# Patient Record
Sex: MALE | Race: OTHER | NOT HISPANIC OR LATINO | URBAN - METROPOLITAN AREA
[De-identification: names, ages, dates, MRNs, and addresses within clinical notes are randomized per-mention and may not be internally consistent; named-entity substitution may affect disease eponyms.]

---

## 2020-05-21 ENCOUNTER — INPATIENT (INPATIENT)
Facility: HOSPITAL | Age: 55
LOS: 5 days | Discharge: HOME | DRG: 811 | End: 2020-05-27
Attending: HOSPITALIST | Admitting: HOSPITALIST
Payer: MEDICAID

## 2020-05-21 VITALS
RESPIRATION RATE: 18 BRPM | OXYGEN SATURATION: 98 % | SYSTOLIC BLOOD PRESSURE: 97 MMHG | HEART RATE: 92 BPM | DIASTOLIC BLOOD PRESSURE: 66 MMHG | TEMPERATURE: 98 F

## 2020-05-21 DIAGNOSIS — Z29.9 ENCOUNTER FOR PROPHYLACTIC MEASURES, UNSPECIFIED: ICD-10-CM

## 2020-05-21 DIAGNOSIS — K92.1 MELENA: ICD-10-CM

## 2020-05-21 DIAGNOSIS — D64.9 ANEMIA, UNSPECIFIED: ICD-10-CM

## 2020-05-21 LAB
ALBUMIN SERPL ELPH-MCNC: 2.7 G/DL — LOW (ref 3.5–5)
ALP SERPL-CCNC: 39 U/L — LOW (ref 40–120)
ALT FLD-CCNC: 18 U/L DA — SIGNIFICANT CHANGE UP (ref 10–60)
ANION GAP SERPL CALC-SCNC: 6 MMOL/L — SIGNIFICANT CHANGE UP (ref 5–17)
APTT BLD: 27.5 SEC — SIGNIFICANT CHANGE UP (ref 27.5–36.3)
AST SERPL-CCNC: 12 U/L — SIGNIFICANT CHANGE UP (ref 10–40)
BASOPHILS # BLD AUTO: 0.01 K/UL — SIGNIFICANT CHANGE UP (ref 0–0.2)
BASOPHILS NFR BLD AUTO: 0.1 % — SIGNIFICANT CHANGE UP (ref 0–2)
BILIRUB SERPL-MCNC: 0.4 MG/DL — SIGNIFICANT CHANGE UP (ref 0.2–1.2)
BUN SERPL-MCNC: 32 MG/DL — HIGH (ref 7–18)
CALCIUM SERPL-MCNC: 7.3 MG/DL — LOW (ref 8.4–10.5)
CHLORIDE SERPL-SCNC: 107 MMOL/L — SIGNIFICANT CHANGE UP (ref 96–108)
CO2 SERPL-SCNC: 27 MMOL/L — SIGNIFICANT CHANGE UP (ref 22–31)
CREAT SERPL-MCNC: 0.68 MG/DL — SIGNIFICANT CHANGE UP (ref 0.5–1.3)
EOSINOPHIL # BLD AUTO: 0.01 K/UL — SIGNIFICANT CHANGE UP (ref 0–0.5)
EOSINOPHIL NFR BLD AUTO: 0.1 % — SIGNIFICANT CHANGE UP (ref 0–6)
GLUCOSE SERPL-MCNC: 145 MG/DL — HIGH (ref 70–99)
HCT VFR BLD CALC: 14.5 % — CRITICAL LOW (ref 39–50)
HGB BLD-MCNC: 5.1 G/DL — CRITICAL LOW (ref 13–17)
IMM GRANULOCYTES NFR BLD AUTO: 0.8 % — SIGNIFICANT CHANGE UP (ref 0–1.5)
INR BLD: 1.27 RATIO — HIGH (ref 0.88–1.16)
IRON SATN MFR SERPL: 47 % — SIGNIFICANT CHANGE UP (ref 20–55)
IRON SATN MFR SERPL: 99 UG/DL — SIGNIFICANT CHANGE UP (ref 65–170)
LYMPHOCYTES # BLD AUTO: 1.09 K/UL — SIGNIFICANT CHANGE UP (ref 1–3.3)
LYMPHOCYTES # BLD AUTO: 12.2 % — LOW (ref 13–44)
MAGNESIUM SERPL-MCNC: 1.8 MG/DL — SIGNIFICANT CHANGE UP (ref 1.6–2.6)
MCHC RBC-ENTMCNC: 32.5 PG — SIGNIFICANT CHANGE UP (ref 27–34)
MCHC RBC-ENTMCNC: 35.2 GM/DL — SIGNIFICANT CHANGE UP (ref 32–36)
MCV RBC AUTO: 92.4 FL — SIGNIFICANT CHANGE UP (ref 80–100)
MONOCYTES # BLD AUTO: 0.55 K/UL — SIGNIFICANT CHANGE UP (ref 0–0.9)
MONOCYTES NFR BLD AUTO: 6.1 % — SIGNIFICANT CHANGE UP (ref 2–14)
NEUTROPHILS # BLD AUTO: 7.23 K/UL — SIGNIFICANT CHANGE UP (ref 1.8–7.4)
NEUTROPHILS NFR BLD AUTO: 80.7 % — HIGH (ref 43–77)
NRBC # BLD: 0 /100 WBCS — SIGNIFICANT CHANGE UP (ref 0–0)
OB PNL STL: POSITIVE
PLATELET # BLD AUTO: 162 K/UL — SIGNIFICANT CHANGE UP (ref 150–400)
POTASSIUM SERPL-MCNC: 3.7 MMOL/L — SIGNIFICANT CHANGE UP (ref 3.5–5.3)
POTASSIUM SERPL-SCNC: 3.7 MMOL/L — SIGNIFICANT CHANGE UP (ref 3.5–5.3)
PROT SERPL-MCNC: 5.1 G/DL — LOW (ref 6–8.3)
PROTHROM AB SERPL-ACNC: 14.4 SEC — HIGH (ref 10–12.9)
RBC # BLD: 1.57 M/UL — LOW (ref 4.2–5.8)
RBC # FLD: 12.3 % — SIGNIFICANT CHANGE UP (ref 10.3–14.5)
SARS-COV-2 RNA SPEC QL NAA+PROBE: SIGNIFICANT CHANGE UP
SODIUM SERPL-SCNC: 140 MMOL/L — SIGNIFICANT CHANGE UP (ref 135–145)
TIBC SERPL-MCNC: 212 UG/DL — LOW (ref 250–450)
TROPONIN I SERPL-MCNC: <0.015 NG/ML — SIGNIFICANT CHANGE UP (ref 0–0.04)
UIBC SERPL-MCNC: 113 UG/DL — SIGNIFICANT CHANGE UP (ref 110–370)
WBC # BLD: 8.96 K/UL — SIGNIFICANT CHANGE UP (ref 3.8–10.5)
WBC # FLD AUTO: 8.96 K/UL — SIGNIFICANT CHANGE UP (ref 3.8–10.5)

## 2020-05-21 PROCEDURE — 99223 1ST HOSP IP/OBS HIGH 75: CPT

## 2020-05-21 PROCEDURE — 99291 CRITICAL CARE FIRST HOUR: CPT

## 2020-05-21 PROCEDURE — 71045 X-RAY EXAM CHEST 1 VIEW: CPT | Mod: 26

## 2020-05-21 RX ORDER — PANTOPRAZOLE SODIUM 20 MG/1
40 TABLET, DELAYED RELEASE ORAL ONCE
Refills: 0 | Status: COMPLETED | OUTPATIENT
Start: 2020-05-21 | End: 2020-05-21

## 2020-05-21 RX ORDER — PANTOPRAZOLE SODIUM 20 MG/1
40 TABLET, DELAYED RELEASE ORAL EVERY 12 HOURS
Refills: 0 | Status: DISCONTINUED | OUTPATIENT
Start: 2020-05-21 | End: 2020-05-22

## 2020-05-21 RX ORDER — SODIUM CHLORIDE 9 MG/ML
1000 INJECTION INTRAMUSCULAR; INTRAVENOUS; SUBCUTANEOUS ONCE
Refills: 0 | Status: COMPLETED | OUTPATIENT
Start: 2020-05-21 | End: 2020-05-21

## 2020-05-21 RX ADMIN — SODIUM CHLORIDE 1000 MILLILITER(S): 9 INJECTION INTRAMUSCULAR; INTRAVENOUS; SUBCUTANEOUS at 19:13

## 2020-05-21 RX ADMIN — PANTOPRAZOLE SODIUM 40 MILLIGRAM(S): 20 TABLET, DELAYED RELEASE ORAL at 20:58

## 2020-05-21 RX ADMIN — SODIUM CHLORIDE 1000 MILLILITER(S): 9 INJECTION INTRAMUSCULAR; INTRAVENOUS; SUBCUTANEOUS at 20:56

## 2020-05-21 NOTE — ED PROVIDER NOTE - GASTROINTESTINAL, MLM
Abdomen soft, non-tender, no guarding; rectal exam reveals dark brown/blackish stool, slight reddish hue

## 2020-05-21 NOTE — ED PROVIDER NOTE - CARE PLAN
Principal Discharge DX:	Gastrointestinal hemorrhage with melena  Secondary Diagnosis:	Severe anemia  Secondary Diagnosis:	Syncope, unspecified syncope type

## 2020-05-21 NOTE — ED PROVIDER NOTE - CLINICAL SUMMARY MEDICAL DECISION MAKING FREE TEXT BOX
54 y/o man, no known PMH, c/o 2 days of melena, vomiting w/dark colored vomitus, generalized weakness.  He also had syncope x 2 today with no injury reported--labs, EKG, CXR, IVF, reassess.

## 2020-05-21 NOTE — ED ADULT NURSE NOTE - NSIMPLEMENTINTERV_GEN_ALL_ED
Implemented All Fall Risk Interventions:  [place] to call system. Call bell, personal items and telephone within reach. Instruct patient to call for assistance. Room bathroom lighting operational. Non-slip footwear when patient is off stretcher. Physically safe environment: no spills, clutter or unnecessary equipment. Stretcher in lowest position, wheels locked, appropriate side rails in place. Provide visual cue, wrist band, yellow gown, etc. Monitor gait and stability. Monitor for mental status changes and reorient to person, place, and time. Review medications for side effects contributing to fall risk. Reinforce activity limits and safety measures with patient and family.

## 2020-05-21 NOTE — H&P ADULT - HISTORY OF PRESENT ILLNESS
Patient is 55 year old male with hx of gout came to ED for bloody stool for 2-3 days.  per pt he started to have blood stool 3 days ago, 2 episode a day. Pt also had 2 episodes vomiting with blood. today pt was feeling weak and fainted as well but he did not fall as he sat down with support of door which was witnessed by kinglord. Pt pt denies having EGD /colonoscopy in the past. Pt never had hx of gi bleed but had a episode of black stool long back. pt was given gout medication 2 weeks ago. but does not remember the medication or pharmacy name. pt states his father and mother had liver cancer. pt works at [employer].   pt's hemoglobin was found 5.1, anemia panel was sent, ppi was given and pt is being transfused 2 prbcs. Denies taking any pain medication.

## 2020-05-21 NOTE — H&P ADULT - ATTENDING COMMENTS
Hx taken via video translation   55 year old Mandarin speaking man with no significant PMH except GOUT presenting with 3 days of melena and 1 day of hematemesis. There was associated dizziness and a syncopal episode - unwitnessed. His landlord saw him in the doorway and was sent to the ED. No abdominal pain, fever, CVS or resp symptoms on ROS.  No prior episodes.   No significant use of NSAIDs ( but uses a prescribed pain meds for his gout but does not know the name). No significant alcohol use or tobacco abuse.  No allergy to meds or food. Only uses gout meds,( does not know the name or pharmacy) 2 dietary supplements - one of which is beeswax  In the ED, he felt better with a liter of NS; his hgb is 5.1 but the baseline is not known.    Vital Signs Last 24 Hrs  T(C): 36.4 (21 May 2020 21:54), Max: 36.9 (21 May 2020 18:29)  T(F): 97.6 (21 May 2020 21:54), Max: 98.5 (21 May 2020 18:29)  HR: 83 (21 May 2020 21:54) (78 - 92)  BP: 103/57 (21 May 2020 21:54) (93/55 - 123/70)-  RR: 18 (21 May 2020 21:54) (18 - 18)  SpO2: 100% (21 May 2020 21:54) (98% - 100%)    Middle aged man, NAD AAO X 3  No obvious trauma to head and neck   CTA B/L   RRR S1S2 only  Soft, NT ND BS +  No pedal edema  No focal deficits    Labs                        5.1    8.96  )-----------( 162      ( 21 May 2020 19:13 )             14.5     05-21    140  |  107  |  32<H>  ----------------------------<  145<H>  3.7   |  27  |  0.68    Ca    7.3<L>      21 May 2020 19:05  Mg     1.8     05-21    TPro  5.1<L>  /  Alb  2.7<L>  /  TBili  0.4  /  DBili  x   /  AST  12  /  ALT  18  /  AlkPhos  39<L>  05-21    PT/INR - ( 21 May 2020 19:34 )   PT: 14.4 sec;   INR: 1.27 ratio    PTT - ( 21 May 2020 19:34 )  PTT:27.5 sec    Iron panel - WNL  Trop -ve X 1   EKG -- NSR    Impression  - Acute symptomatic anemia  - Suspected upper GI bleeding  - Syncopal episode secondary to acute blood loss anemia  - Hyperglycemia - r/o DM    Plan  Admit to Medicine  serial H/H  type and cross 2 units of packed red cells ans transfuse  CTA abdomen to r/o active bleeding  No evidence of iron deficiency  Protonix 40mg IV q 12 hourly   Check HgA1c  Syncopal episode secondary to blood loss; no further syncope workup planned Hx taken via video translation   Case discussed with MAR.  HPI /ROS / PMH as above    55 year old Mandarin speaking man with no significant PMH except GOUT presenting with 3 days of melena and 1 day of hematemesis. There was associated dizziness and a syncopal episode - unwitnessed. His landlord saw him in the doorway and was sent to the ED. No abdominal pain, fever, CVS or resp symptoms on ROS.  No prior episodes.   No significant use of NSAIDs ( but uses a prescribed pain meds for his gout but does not know the name). No significant alcohol use or tobacco abuse.  No allergy to meds or food. Only uses gout meds,( does not know the name or pharmacy) 2 dietary supplements - one of which is beeswax  In the ED, he felt better with a liter of NS; his hgb is 5.1 but the baseline is not known.    Vital Signs Last 24 Hrs  T(C): 36.4 (21 May 2020 21:54), Max: 36.9 (21 May 2020 18:29)  T(F): 97.6 (21 May 2020 21:54), Max: 98.5 (21 May 2020 18:29)  HR: 83 (21 May 2020 21:54) (78 - 92)  BP: 103/57 (21 May 2020 21:54) (93/55 - 123/70)-  RR: 18 (21 May 2020 21:54) (18 - 18)  SpO2: 100% (21 May 2020 21:54) (98% - 100%)    Middle aged man, NAD AAO X 3  No obvious trauma to head and neck   CTA B/L   RRR S1S2 only  Soft, NT ND BS +  No pedal edema  No focal deficits    Labs                        5.1    8.96  )-----------( 162      ( 21 May 2020 19:13 )             14.5     05-21    140  |  107  |  32<H>  ----------------------------<  145<H>  3.7   |  27  |  0.68    Ca    7.3<L>      21 May 2020 19:05  Mg     1.8     05-21    TPro  5.1<L>  /  Alb  2.7<L>  /  TBili  0.4  /  DBili  x   /  AST  12  /  ALT  18  /  AlkPhos  39<L>  05-21    PT/INR - ( 21 May 2020 19:34 )   PT: 14.4 sec;   INR: 1.27 ratio    PTT - ( 21 May 2020 19:34 )  PTT:27.5 sec    Iron panel - WNL  Trop -ve X 1   EKG -- NSR    Impression  - Acute symptomatic anemia  - Suspected upper GI bleeding  - Syncopal episode secondary to acute blood loss anemia  - Hyperglycemia - r/o DM    Plan  Admit to Medicine  serial H/H  type and cross 2 units of packed red cells ans transfuse  CTA abdomen to r/o active bleeding  No evidence of iron deficiency  Protonix 40mg IV q 12 hourly   GI consult  Check HgA1c  Syncopal episode secondary to blood loss; no further syncope workup planned

## 2020-05-21 NOTE — H&P ADULT - NSHPPHYSICALEXAM_GEN_ALL_CORE
PHYSICAL EXAM:  GENERAL: NAD, well-developed  HEAD:  Atraumatic, Normocephalic  EYES: EOMI, PERRLA, conjunctiva and sclera clear  NECK: Supple, No JVD  CHEST/LUNG: Clear to auscultation bilaterally; No wheeze  HEART: Regular rate and rhythm; s1+ s2+  ABDOMEN: Soft, Nontender, Nondistended; Bowel sounds present  EXTREMITIES:, No clubbing, cyanosis, or edema  NEUROLOGY:AAOx3 non-focal  SKIN: No rashes or lesions

## 2020-05-21 NOTE — ED PROVIDER NOTE - OBJECTIVE STATEMENT
54 y/o man, no known PMH, c/o 2 days of melena, vomiting w/dark colored vomitus, generalized weakness.  He also had syncope x 2 today with no injury reported.  Had mild abdominal discomfort.  No CP/SOB/fever.  Denies h/o GIB.  Denies any use of antiplatelets or anticoagulation. 54 y/o man, no known PMH, c/o 2 days of melena, vomiting w/dark colored vomitus, generalized weakness.  He also had syncope x 2 today with no injury reported.  Had mild abdominal discomfort.  No CP/SOB/fever.  Denies h/o GIB.  Denies any use of antiplatelets or anticoagulation.    Oneyda Rafy, #[id number].

## 2020-05-21 NOTE — H&P ADULT - PROBLEM SELECTOR PLAN 1
Hemoglobin 5.1, likely gi bleed   not sure upper gi or lower gi   so far anemia panel is not consistent with iron deficiency anemia, f/u ferritin   s/p ppi   on ppi q12  getting 2 prbcs   f/u CT A abdomen r/o acitve bleed   GI consult

## 2020-05-22 LAB
24R-OH-CALCIDIOL SERPL-MCNC: 30.1 NG/ML — SIGNIFICANT CHANGE UP (ref 30–80)
A1C WITH ESTIMATED AVERAGE GLUCOSE RESULT: 5.6 % — SIGNIFICANT CHANGE UP (ref 4–5.6)
ALBUMIN SERPL ELPH-MCNC: 2.4 G/DL — LOW (ref 3.5–5)
ALP SERPL-CCNC: 36 U/L — LOW (ref 40–120)
ALT FLD-CCNC: 12 U/L DA — SIGNIFICANT CHANGE UP (ref 10–60)
ANION GAP SERPL CALC-SCNC: 4 MMOL/L — LOW (ref 5–17)
APPEARANCE UR: CLEAR — SIGNIFICANT CHANGE UP
AST SERPL-CCNC: 11 U/L — SIGNIFICANT CHANGE UP (ref 10–40)
BASOPHILS # BLD AUTO: 0.02 K/UL — SIGNIFICANT CHANGE UP (ref 0–0.2)
BASOPHILS NFR BLD AUTO: 0.3 % — SIGNIFICANT CHANGE UP (ref 0–2)
BILIRUB SERPL-MCNC: 0.9 MG/DL — SIGNIFICANT CHANGE UP (ref 0.2–1.2)
BILIRUB UR-MCNC: NEGATIVE — SIGNIFICANT CHANGE UP
BUN SERPL-MCNC: 20 MG/DL — HIGH (ref 7–18)
CALCIUM SERPL-MCNC: 7.2 MG/DL — LOW (ref 8.4–10.5)
CHLORIDE SERPL-SCNC: 112 MMOL/L — HIGH (ref 96–108)
CHOLEST SERPL-MCNC: 96 MG/DL — SIGNIFICANT CHANGE UP (ref 10–199)
CO2 SERPL-SCNC: 25 MMOL/L — SIGNIFICANT CHANGE UP (ref 22–31)
COLOR SPEC: YELLOW — SIGNIFICANT CHANGE UP
CREAT SERPL-MCNC: 0.54 MG/DL — SIGNIFICANT CHANGE UP (ref 0.5–1.3)
DIFF PNL FLD: NEGATIVE — SIGNIFICANT CHANGE UP
EOSINOPHIL # BLD AUTO: 0.02 K/UL — SIGNIFICANT CHANGE UP (ref 0–0.5)
EOSINOPHIL NFR BLD AUTO: 0.3 % — SIGNIFICANT CHANGE UP (ref 0–6)
ESTIMATED AVERAGE GLUCOSE: 114 MG/DL — SIGNIFICANT CHANGE UP (ref 68–114)
FERRITIN SERPL-MCNC: 134 NG/ML — SIGNIFICANT CHANGE UP (ref 30–400)
FOLATE SERPL-MCNC: 15.3 NG/ML — SIGNIFICANT CHANGE UP
GLUCOSE SERPL-MCNC: 116 MG/DL — HIGH (ref 70–99)
GLUCOSE UR QL: NEGATIVE — SIGNIFICANT CHANGE UP
HCT VFR BLD CALC: 18.9 % — CRITICAL LOW (ref 39–50)
HCT VFR BLD CALC: 21.4 % — LOW (ref 39–50)
HCT VFR BLD CALC: 22.7 % — LOW (ref 39–50)
HDLC SERPL-MCNC: 22 MG/DL — LOW
HGB BLD-MCNC: 6.5 G/DL — CRITICAL LOW (ref 13–17)
HGB BLD-MCNC: 7.5 G/DL — LOW (ref 13–17)
HGB BLD-MCNC: 8 G/DL — LOW (ref 13–17)
IMM GRANULOCYTES NFR BLD AUTO: 0.7 % — SIGNIFICANT CHANGE UP (ref 0–1.5)
KETONES UR-MCNC: NEGATIVE — SIGNIFICANT CHANGE UP
LEUKOCYTE ESTERASE UR-ACNC: NEGATIVE — SIGNIFICANT CHANGE UP
LIPID PNL WITH DIRECT LDL SERPL: 18 MG/DL — SIGNIFICANT CHANGE UP
LYMPHOCYTES # BLD AUTO: 1.45 K/UL — SIGNIFICANT CHANGE UP (ref 1–3.3)
LYMPHOCYTES # BLD AUTO: 24.6 % — SIGNIFICANT CHANGE UP (ref 13–44)
MAGNESIUM SERPL-MCNC: 1.9 MG/DL — SIGNIFICANT CHANGE UP (ref 1.6–2.6)
MCHC RBC-ENTMCNC: 31 PG — SIGNIFICANT CHANGE UP (ref 27–34)
MCHC RBC-ENTMCNC: 31.1 PG — SIGNIFICANT CHANGE UP (ref 27–34)
MCHC RBC-ENTMCNC: 31.4 PG — SIGNIFICANT CHANGE UP (ref 27–34)
MCHC RBC-ENTMCNC: 34.4 GM/DL — SIGNIFICANT CHANGE UP (ref 32–36)
MCHC RBC-ENTMCNC: 35 GM/DL — SIGNIFICANT CHANGE UP (ref 32–36)
MCHC RBC-ENTMCNC: 35.2 GM/DL — SIGNIFICANT CHANGE UP (ref 32–36)
MCV RBC AUTO: 88.3 FL — SIGNIFICANT CHANGE UP (ref 80–100)
MCV RBC AUTO: 88.4 FL — SIGNIFICANT CHANGE UP (ref 80–100)
MCV RBC AUTO: 91.3 FL — SIGNIFICANT CHANGE UP (ref 80–100)
MONOCYTES # BLD AUTO: 0.51 K/UL — SIGNIFICANT CHANGE UP (ref 0–0.9)
MONOCYTES NFR BLD AUTO: 8.6 % — SIGNIFICANT CHANGE UP (ref 2–14)
NEUTROPHILS # BLD AUTO: 3.86 K/UL — SIGNIFICANT CHANGE UP (ref 1.8–7.4)
NEUTROPHILS NFR BLD AUTO: 65.5 % — SIGNIFICANT CHANGE UP (ref 43–77)
NITRITE UR-MCNC: NEGATIVE — SIGNIFICANT CHANGE UP
NRBC # BLD: 0 /100 WBCS — SIGNIFICANT CHANGE UP (ref 0–0)
PH UR: 5 — SIGNIFICANT CHANGE UP (ref 5–8)
PHOSPHATE SERPL-MCNC: 2 MG/DL — LOW (ref 2.5–4.5)
PLATELET # BLD AUTO: 135 K/UL — LOW (ref 150–400)
PLATELET # BLD AUTO: 135 K/UL — LOW (ref 150–400)
PLATELET # BLD AUTO: 141 K/UL — LOW (ref 150–400)
POTASSIUM SERPL-MCNC: 3.7 MMOL/L — SIGNIFICANT CHANGE UP (ref 3.5–5.3)
POTASSIUM SERPL-SCNC: 3.7 MMOL/L — SIGNIFICANT CHANGE UP (ref 3.5–5.3)
PROT SERPL-MCNC: 4.4 G/DL — LOW (ref 6–8.3)
PROT UR-MCNC: NEGATIVE — SIGNIFICANT CHANGE UP
RBC # BLD: 2.07 M/UL — LOW (ref 4.2–5.8)
RBC # BLD: 2.42 M/UL — LOW (ref 4.2–5.8)
RBC # BLD: 2.57 M/UL — LOW (ref 4.2–5.8)
RBC # FLD: 12.7 % — SIGNIFICANT CHANGE UP (ref 10.3–14.5)
RBC # FLD: 14.3 % — SIGNIFICANT CHANGE UP (ref 10.3–14.5)
RBC # FLD: 15.3 % — HIGH (ref 10.3–14.5)
SODIUM SERPL-SCNC: 141 MMOL/L — SIGNIFICANT CHANGE UP (ref 135–145)
SP GR SPEC: 1.01 — SIGNIFICANT CHANGE UP (ref 1.01–1.02)
TOTAL CHOLESTEROL/HDL RATIO MEASUREMENT: 4.4 RATIO — SIGNIFICANT CHANGE UP (ref 3.4–9.6)
TRANSFERRIN SERPL-MCNC: 170 MG/DL — LOW (ref 200–360)
TRIGL SERPL-MCNC: 279 MG/DL — HIGH (ref 10–149)
UROBILINOGEN FLD QL: NEGATIVE — SIGNIFICANT CHANGE UP
VIT B12 SERPL-MCNC: 424 PG/ML — SIGNIFICANT CHANGE UP (ref 232–1245)
WBC # BLD: 5.11 K/UL — SIGNIFICANT CHANGE UP (ref 3.8–10.5)
WBC # BLD: 5.9 K/UL — SIGNIFICANT CHANGE UP (ref 3.8–10.5)
WBC # BLD: 6.73 K/UL — SIGNIFICANT CHANGE UP (ref 3.8–10.5)
WBC # FLD AUTO: 5.11 K/UL — SIGNIFICANT CHANGE UP (ref 3.8–10.5)
WBC # FLD AUTO: 5.9 K/UL — SIGNIFICANT CHANGE UP (ref 3.8–10.5)
WBC # FLD AUTO: 6.73 K/UL — SIGNIFICANT CHANGE UP (ref 3.8–10.5)

## 2020-05-22 PROCEDURE — 99233 SBSQ HOSP IP/OBS HIGH 50: CPT | Mod: GC

## 2020-05-22 PROCEDURE — 74174 CTA ABD&PLVS W/CONTRAST: CPT | Mod: 26

## 2020-05-22 RX ORDER — PANTOPRAZOLE SODIUM 20 MG/1
8 TABLET, DELAYED RELEASE ORAL
Qty: 80 | Refills: 0 | Status: DISCONTINUED | OUTPATIENT
Start: 2020-05-22 | End: 2020-05-26

## 2020-05-22 RX ORDER — POTASSIUM PHOSPHATE, MONOBASIC POTASSIUM PHOSPHATE, DIBASIC 236; 224 MG/ML; MG/ML
30 INJECTION, SOLUTION INTRAVENOUS ONCE
Refills: 0 | Status: COMPLETED | OUTPATIENT
Start: 2020-05-22 | End: 2020-05-22

## 2020-05-22 RX ADMIN — PANTOPRAZOLE SODIUM 10 MG/HR: 20 TABLET, DELAYED RELEASE ORAL at 21:16

## 2020-05-22 RX ADMIN — PANTOPRAZOLE SODIUM 10 MG/HR: 20 TABLET, DELAYED RELEASE ORAL at 14:17

## 2020-05-22 RX ADMIN — POTASSIUM PHOSPHATE, MONOBASIC POTASSIUM PHOSPHATE, DIBASIC 83.33 MILLIMOLE(S): 236; 224 INJECTION, SOLUTION INTRAVENOUS at 08:12

## 2020-05-22 RX ADMIN — PANTOPRAZOLE SODIUM 40 MILLIGRAM(S): 20 TABLET, DELAYED RELEASE ORAL at 06:16

## 2020-05-22 NOTE — PROGRESS NOTE ADULT - SUBJECTIVE AND OBJECTIVE BOX
PGY1 Note discussed with supervising resident and primary attending.    Patient is a 55y old  Male who presents with a chief complaint of Dizziness (21 May 2020 22:14)      INTERVAL HPI/OVERNIGHT EVENTS:  Pt seen and examined at bedside with KRISTOPHER Yusuf as Rafy   Case discussed at length. Plan of care explained. All questions answered.   Pt reported bloody bowel movement earlier this am - flushed before being seen by RN.   Pt remains hemodynamically stable with no tachycardia on telemetry.   Receiving 3rd PRBC which will complete ~10AM    MEDICATIONS  (STANDING):  pantoprazole  Injectable 40 milliGRAM(s) IV Push every 12 hours    MEDICATIONS  (PRN):      Allergies    No Known Allergies    Intolerances        REVIEW OF SYSTEMS:  Negative except as noted in interval history    Vital Signs Last 24 Hrs  T(C): 37.1 (22 May 2020 07:13), Max: 37.2 (22 May 2020 06:51)  T(F): 98.8 (22 May 2020 07:13), Max: 98.9 (22 May 2020 06:51)  HR: 78 (22 May 2020 07:13) (75 - 92)  BP: 99/54 (22 May 2020 07:13) (93/55 - 123/70)  BP(mean): --  RR: 18 (22 May 2020 07:13) (17 - 18)  SpO2: 98% (22 May 2020 07:13) (96% - 100%)    PHYSICAL EXAM:  GENERAL: NAD, well-groomed, well-developed  HEAD:  Atraumatic, Normocephalic  EYES: EOMI, PERRLA, conjunctiva and sclera clear  NECK: Supple, No JVD, Normal thyroid  CHEST/LUNG: Clear to percussion bilaterally; No rales, rhonchi, wheezing, or rubs  HEART: Regular rate and rhythm; No murmurs, rubs, or gallops  ABDOMEN: Soft, Nontender, Nondistended; Bowel sounds present  NERVOUS SYSTEM:  Alert & Oriented X3, Good concentration; Motor Strength 5/5 B/L   EXTREMITIES:  2+ Peripheral Pulses, No clubbing, cyanosis, or edema        LABS:  cret                        6.5    5.90  )-----------( 135      ( 22 May 2020 04:59 )             18.9     05-22    141  |  112<H>  |  20<H>  ----------------------------<  116<H>  3.7   |  25  |  0.54    Ca    7.2<L>      22 May 2020 04:59  Phos  2.0       Mg     1.9         TPro  4.4<L>  /  Alb  2.4<L>  /  TBili  0.9  /  DBili  x   /  AST  11  /  ALT  12  /  AlkPhos  36<L>      PT/INR - ( 21 May 2020 19:34 )   PT: 14.4 sec;   INR: 1.27 ratio         PTT - ( 21 May 2020 19:34 )  PTT:27.5 sec    Urinalysis Basic - ( 22 May 2020 03:03 )    Color: Yellow / Appearance: Clear / S.015 / pH: x  Gluc: x / Ketone: Negative  / Bili: Negative / Urobili: Negative   Blood: x / Protein: Negative / Nitrite: Negative   Leuk Esterase: Negative / RBC: x / WBC x   Sq Epi: x / Non Sq Epi: x / Bacteria: x      CAPILLARY BLOOD GLUCOSE      POCT Blood Glucose.: 150 mg/dL (21 May 2020 17:39)      RADIOLOGY & ADDITIONAL TESTS:    Imaging Personally Reviewed:  [ ] YES  [ ] NO    Consultant(s) Notes Reviewed:  [ ] YES  [ ] NO

## 2020-05-22 NOTE — CHART NOTE - NSCHARTNOTEFT_GEN_A_CORE
After 2U of PRBC, Hb became from 5.1 to 6.5. Will give another Unit. PRBC 1U was ordered. Hb became from 6.5 at 4am.There were some confusion and this CBC was done just after last unit was finished.   Will wait for another CBC in AM and decide if he needs blood or not.   Called blood bank, they stated 1U for this pt is ready in blood bank.   will follow if comes back, otherwise primary team to follow in AM. Also replacing phos. Hb became from 6.5 at 4:59am, pt's 2nd Unit was finished around 4:30am.There were some confusion and this CBC was done just after last unit was finished.   However, Hb looks low anyway and we'll give another unit.    Primary team to order CBC after 3rd unit was done.    Phosphorus is being replaced.

## 2020-05-22 NOTE — CONSULT NOTE ADULT - SUBJECTIVE AND OBJECTIVE BOX
Patient is a 55y old  Male who presents with a chief complaint of Dizziness (22 May 2020 08:17)    Patient is 55 year old male with hx of gout came to ED for bloody stool for 2-3 days.  per pt he started to have blood stool 3 days ago, 2 episode a day. Pt also had 2 episodes vomiting with blood. today pt was feeling weak and fainted as well but he did not fall as he sat down with support of door which was witnessed by landlord. Pt pt denies having EGD /colonoscopy in the past. Pt never had hx of gi bleed but had a episode of black stool long back. pt was given gout medication 2 weeks ago. but does not remember the medication or pharmacy name. pt states his father and mother had liver cancer. pt works at [employer].   pt's hemoglobin was found 5.1, anemia panel was sent, ppi was given and pt is being transfused 2 prbcs. Denies taking any pain medication.     REVIEW OF SYSTEMS  Constitutional:   No fever, no fatigue, no pallor, no night sweats, no weight loss.  HEENT:   No eye pain, no vision changes, no icterus, no mouth ulcers.  Respiratory:   No shortness of breath, no cough, no respiratory distress.   Cardiovascular:   No chest pain, no palpitations.   Gastrointestinal: No abdominal pain, no nausea, no vomiting , no diarrhea no constipation, no hematochezia, + melena.  Skin:   No rashes, no jaundice, no eczema.   Musculoskeletal:   No joint pain, no swelling, no myalgia.   Neurologic:   No headache, no seizure, no weakness.   Genitourinary:   No dysuria, no decreased urine output.  Psychiatric:  No depression, no anxiety,   Endocrine:   No thyroid disease, no diabetes.  Heme/Lymphatic:   No anemia, no blood transfusions, no lymph node enlargement, no bleeding, no bruising.  ___________________________________________________________________________________________  Allergies    No Known Allergies    Intolerances      MEDICATIONS  (STANDING):  pantoprazole Infusion 8 mG/Hr (10 mL/Hr) IV Continuous <Continuous>    MEDICATIONS  (PRN):      PAST MEDICAL & SURGICAL HISTORY:  No pertinent past medical history  No significant past surgical history    FAMILY HISTORY:    Social History: No hsitory of : Tobacco use, IVDA, EToH  ______________________________________________________________________________________    PHYSICAL EXAM    Daily     Daily Weight in k.1 (22 May 2020 04:15)  BMI:   Change in Weight:  Vital Signs Last 24 Hrs  T(C): 37.4 (22 May 2020 10:30), Max: 37.4 (22 May 2020 10:30)  T(F): 99.3 (22 May 2020 10:30), Max: 99.3 (22 May 2020 10:30)  HR: 70 (22 May 2020 10:30) (70 - 92)  BP: 101/54 (22 May 2020 10:30) (93/55 - 123/70)  BP(mean): --  RR: 18 (22 May 2020 10:30) (17 - 18)  SpO2: 96% (22 May 2020 10:30) (96% - 100%)    General:  Well developed, well nourished, alert and active, no pallor, NAD.  HEENT:    Normal appearance of conjunctiva, ears, nose, lips, oropharynx, and oral mucosa, anicteric.  Neck:  No masses, no asymmetry.  Lymph Nodes:  No lymphadenopathy.   Cardiovascular:  RRR normal S1/S2, no murmur.  Respiratory:  CTA B/L, normal respiratory effort.   Abdominal:   soft, no masses or tenderness, normoactive BS, NT/ND, no HSM.  Extremities:   No clubbing or cyanosis, normal capillary refill, no edema.   Skin:   No rash, jaundice, lesions, eczema.   Musculoskeletal:  No joint swelling, erythema or tenderness.   Neuro: No focal deficits.   Other:   _______________________________________________________________________________________________  Lab Results:                          8.0    6.73  )-----------( 141      ( 22 May 2020 12:42 )             22.7         141  |  112<H>  |  20<H>  ----------------------------<  116<H>  3.7   |  25  |  0.54    Ca    7.2<L>      22 May 2020 04:59  Phos  2.0       Mg     1.9         TPro  4.4<L>  /  Alb  2.4<L>  /  TBili  0.9  /  DBili  x   /  AST  11  /  ALT  12  /  AlkPhos  36<L>      LIVER FUNCTIONS - ( 22 May 2020 04:59 )  Alb: 2.4 g/dL / Pro: 4.4 g/dL / ALK PHOS: 36 U/L / ALT: 12 U/L DA / AST: 11 U/L / GGT: x           PT/INR - ( 21 May 2020 19:34 )   PT: 14.4 sec;   INR: 1.27 ratio         PTT - ( 21 May 2020 19:34 )  PTT:27.5 sec  Triglycerides, Serum: 279 mg/dL ( @ 04:59)    CARDIAC MARKERS ( 21 May 2020 19:05 )  <0.015 ng/mL / x     / x     / x     / x          Stool Results:          RADIOLOGY RESULTS:    SURGICAL PATHOLOGY:

## 2020-05-22 NOTE — PROGRESS NOTE ADULT - PROBLEM SELECTOR PLAN 1
Hemoglobin 5.1, likely gi bleed   FOBT +  Hgb 5.1 increased to 6.5 after 2PRBC. Currently receiving 3rd PRBC  CBC q6-8, next at noon following PRBC  anemia panel is not consistent with iron deficiency anemia, Normocytic with normal iron studies  on ppi q12   f/u CT A abdomen r/o active bleed   GI consult

## 2020-05-22 NOTE — ED ADULT NURSE REASSESSMENT NOTE - NS ED NURSE REASSESS COMMENT FT1
pt is aaox4/no acute distress noted .pt speak chines /1st blood transfusion finished  at 00:30 am .kinga /rn translate and verified the blood .2nd transfusion started at 01:00am .no transfusion reaction noted .report given to rn/matthew.

## 2020-05-22 NOTE — CONSULT NOTE ADULT - ASSESSMENT
Anemia;  Needs resuscitation   Transfuse to a gaol hemoglobin around 8   IV PPI   Monitor CBC Q12 hours   High concern for gastric CA   Please obtain CT of the abdomen pelvis with contrast Colon and EGD scheduled for Tuesday   PLEASE UPDATE COVID STATUS ON MONDAY   I was physically present for the key portions of the evaluation and management (E/M) service provided.  The patient was personally seen and examined at bedside.    Thank you for your consultation and allowing  me to participate in the care of your patients. If you have further questions please contact me at [phone].     Catrachito Patton M.D.       _________________________________________________________________________________________________  [facility]  [address] [place] [address]  Office: [phone]    Kwasi Smith PA-C  ___________________________________________________________________________________________________

## 2020-05-22 NOTE — PROGRESS NOTE ADULT - ATTENDING COMMENTS
Patient was seen and examined by myself. Case was discussed with house staff in details. I have reviewed and agree with the plan as outlined above with edits where appropriate.      Vital Signs Last 24 Hrs  T(C): 36.6 (22 May 2020 19:37), Max: 37.4 (22 May 2020 10:30)  T(F): 97.9 (22 May 2020 19:37), Max: 99.3 (22 May 2020 10:30)  HR: 64 (22 May 2020 19:37) (62 - 87)  BP: 107/67 (22 May 2020 19:37) (97/58 - 120/58)  RR: 18 (22 May 2020 19:37) (17 - 18)  SpO2: 98% (22 May 2020 19:37) (96% - 100%)                          7.5    5.11  )-----------( 135      ( 22 May 2020 22:37 )             21.4       05-22    141  |  112<H>  |  20<H>  ----------------------------<  116<H>  3.7   |  25  |  0.54    Ca    7.2<L>      22 May 2020 04:59  Phos  2.0     05-22  Mg     1.9     05-22    TPro  4.4<L>  /  Alb  2.4<L>  /  TBili  0.9  /  DBili  x   /  AST  11  /  ALT  12  /  AlkPhos  36<L>  05-22    A/P: 54 y/o M with   1. acute blood loss anemia   2. GI bleed presumed upper GI bleed  3. Hypotension likely due to volume loss from GI bleed  4. Hypoalbuminemia    Plan- IV PPI  - serial cbc every 8f=hrs  - PRN PRBC transfusion   - Monitor vitals closely  - GI consult and follow up  - Orthostatic BP   Will benefits from upper endoscopy

## 2020-05-23 DIAGNOSIS — D62 ACUTE POSTHEMORRHAGIC ANEMIA: ICD-10-CM

## 2020-05-23 LAB
ANION GAP SERPL CALC-SCNC: 7 MMOL/L — SIGNIFICANT CHANGE UP (ref 5–17)
BLD GP AB SCN SERPL QL: SIGNIFICANT CHANGE UP
BUN SERPL-MCNC: 9 MG/DL — SIGNIFICANT CHANGE UP (ref 7–18)
CALCIUM SERPL-MCNC: 7.5 MG/DL — LOW (ref 8.4–10.5)
CHLORIDE SERPL-SCNC: 110 MMOL/L — HIGH (ref 96–108)
CO2 SERPL-SCNC: 27 MMOL/L — SIGNIFICANT CHANGE UP (ref 22–31)
CREAT SERPL-MCNC: 0.63 MG/DL — SIGNIFICANT CHANGE UP (ref 0.5–1.3)
FOLATE SERPL-MCNC: >20 NG/ML — SIGNIFICANT CHANGE UP
GLUCOSE SERPL-MCNC: 104 MG/DL — HIGH (ref 70–99)
HCT VFR BLD CALC: 22.5 % — LOW (ref 39–50)
HCT VFR BLD CALC: 22.9 % — LOW (ref 39–50)
HGB BLD-MCNC: 7.6 G/DL — LOW (ref 13–17)
HGB BLD-MCNC: 7.9 G/DL — LOW (ref 13–17)
MCHC RBC-ENTMCNC: 30.3 PG — SIGNIFICANT CHANGE UP (ref 27–34)
MCHC RBC-ENTMCNC: 31.2 PG — SIGNIFICANT CHANGE UP (ref 27–34)
MCHC RBC-ENTMCNC: 33.8 GM/DL — SIGNIFICANT CHANGE UP (ref 32–36)
MCHC RBC-ENTMCNC: 34.5 GM/DL — SIGNIFICANT CHANGE UP (ref 32–36)
MCV RBC AUTO: 89.6 FL — SIGNIFICANT CHANGE UP (ref 80–100)
MCV RBC AUTO: 90.5 FL — SIGNIFICANT CHANGE UP (ref 80–100)
NRBC # BLD: 0 /100 WBCS — SIGNIFICANT CHANGE UP (ref 0–0)
NRBC # BLD: 0 /100 WBCS — SIGNIFICANT CHANGE UP (ref 0–0)
PLATELET # BLD AUTO: 144 K/UL — LOW (ref 150–400)
PLATELET # BLD AUTO: 152 K/UL — SIGNIFICANT CHANGE UP (ref 150–400)
POTASSIUM SERPL-MCNC: 3.8 MMOL/L — SIGNIFICANT CHANGE UP (ref 3.5–5.3)
POTASSIUM SERPL-SCNC: 3.8 MMOL/L — SIGNIFICANT CHANGE UP (ref 3.5–5.3)
RBC # BLD: 2.51 M/UL — LOW (ref 4.2–5.8)
RBC # BLD: 2.53 M/UL — LOW (ref 4.2–5.8)
RBC # FLD: 15.9 % — HIGH (ref 10.3–14.5)
RBC # FLD: 16.2 % — HIGH (ref 10.3–14.5)
SODIUM SERPL-SCNC: 144 MMOL/L — SIGNIFICANT CHANGE UP (ref 135–145)
WBC # BLD: 4.58 K/UL — SIGNIFICANT CHANGE UP (ref 3.8–10.5)
WBC # BLD: 6.59 K/UL — SIGNIFICANT CHANGE UP (ref 3.8–10.5)
WBC # FLD AUTO: 4.58 K/UL — SIGNIFICANT CHANGE UP (ref 3.8–10.5)
WBC # FLD AUTO: 6.59 K/UL — SIGNIFICANT CHANGE UP (ref 3.8–10.5)

## 2020-05-23 PROCEDURE — 99232 SBSQ HOSP IP/OBS MODERATE 35: CPT | Mod: GC

## 2020-05-23 RX ADMIN — PANTOPRAZOLE SODIUM 10 MG/HR: 20 TABLET, DELAYED RELEASE ORAL at 06:24

## 2020-05-23 RX ADMIN — PANTOPRAZOLE SODIUM 10 MG/HR: 20 TABLET, DELAYED RELEASE ORAL at 18:01

## 2020-05-23 NOTE — PROGRESS NOTE ADULT - PROBLEM SELECTOR PLAN 1
Acute symptomatic blood loss anemia presumed due to upper GI bleed.  CTA negative  Resolving melena  Contineu PPI  EGD on Tuesday  Clear liquid diet  GI follow up Acute symptomatic blood loss anemia presumed due to upper GI bleed.  CTA negative for active bleeding  Resolving melena  Serial CBC  PRBC if hb <7  Continue PPI  EGD on Tuesday  Clear liquid diet  GI follow up- Dr Burroughs

## 2020-05-23 NOTE — PROGRESS NOTE ADULT - SUBJECTIVE AND OBJECTIVE BOX
MEDICAL ATTENDING NOTE    Patient is a 55y old  Male who presents with a chief complaint of Dizziness (22 May 2020 14:58)      INTERVAL HPI/OVERNIGHT EVENTS: offers no new complaints today    MEDICATIONS  (STANDING):  pantoprazole Infusion 8 mG/Hr (10 mL/Hr) IV Continuous <Continuous>    MEDICATIONS  (PRN):      __________________________________________________  ----------------------------------------------------------------------------------  REVIEW OF SYSTEMS: negative      Vital Signs Last 24 Hrs  T(C): 36.4 (23 May 2020 11:38), Max: 37.1 (23 May 2020 07:30)  T(F): 97.5 (23 May 2020 11:38), Max: 98.8 (23 May 2020 07:30)  HR: 57 (23 May 2020 11:38) (57 - 66)  BP: 104/60 (23 May 2020 11:38) (101/59 - 120/58)  BP(mean): --  RR: 16 (23 May 2020 11:38) (16 - 18)  SpO2: 99% (23 May 2020 11:38) (95% - 99%)    _________________  PHYSICAL EXAM:  ---------------------------   NAD; Normocephalic;   LUNGS - no wheezing  HEART: S1 S2+   ABDOMEN: Soft, Nontender, non distended  EXTREMITIES: no cyanosis; no edema  NERVOUS SYSTEM:  Awake and alert; no focal neuro deficits appreciated    _________________________________________________  LABS:                        7.6    4.58  )-----------( 144      ( 23 May 2020 07:30 )             22.5     05-23    144  |  110<H>  |  9   ----------------------------<  104<H>  3.8   |  27  |  0.63    Ca    7.5<L>      23 May 2020 07:30  Phos  2.0     05-  Mg     1.9         TPro  4.4<L>  /  Alb  2.4<L>  /  TBili  0.9  /  DBili  x   /  AST  11  /  ALT  12  /  AlkPhos  36<L>  05-22    PT/INR - ( 21 May 2020 19:34 )   PT: 14.4 sec;   INR: 1.27 ratio         PTT - ( 21 May 2020 19:34 )  PTT:27.5 sec  Urinalysis Basic - ( 22 May 2020 03:03 )    Color: Yellow / Appearance: Clear / S.015 / pH: x  Gluc: x / Ketone: Negative  / Bili: Negative / Urobili: Negative   Blood: x / Protein: Negative / Nitrite: Negative   Leuk Esterase: Negative / RBC: x / WBC x   Sq Epi: x / Non Sq Epi: x / Bacteria: x      CAPILLARY BLOOD GLUCOSE                    Plan of care was discussed with patient ; all questions and concerns were addressed and care was aligned with patient's wishes. MEDICAL ATTENDING NOTE    Patient is a 55y old  Male who presents with a chief complaint of Dizziness (22 May 2020 14:58)      INTERVAL HPI/OVERNIGHT EVENTS: offers no new complaints today    MEDICATIONS  (STANDING):  pantoprazole Infusion 8 mG/Hr (10 mL/Hr) IV Continuous <Continuous>    MEDICATIONS  (PRN):      __________________________________________________  ----------------------------------------------------------------------------------  REVIEW OF SYSTEMS: negative      Vital Signs Last 24 Hrs  T(C): 36.4 (23 May 2020 11:38), Max: 37.1 (23 May 2020 07:30)  T(F): 97.5 (23 May 2020 11:38), Max: 98.8 (23 May 2020 07:30)  HR: 57 (23 May 2020 11:38) (57 - 66)  BP: 104/60 (23 May 2020 11:38) (101/59 - 120/58)  RR: 16 (23 May 2020 11:38) (16 - 18)  SpO2: 99% (23 May 2020 11:38) (95% - 99%)    _________________  PHYSICAL EXAM:  ---------------------------   NAD; Normocephalic;   LUNGS - no wheezing  HEART: S1 S2+   ABDOMEN: Soft, Nontender, non distended, BS+  EXTREMITIES: no cyanosis; no edema  NERVOUS SYSTEM:  Awake and alert; no focal neuro deficits appreciated    _________________________________________________  LABS:                        7.6    4.58  )-----------( 144      ( 23 May 2020 07:30 )             22.5     05-23    144  |  110<H>  |  9   ----------------------------<  104<H>  3.8   |  27  |  0.63    Ca    7.5<L>      23 May 2020 07:30  Phos  2.0     -  Mg     1.9         TPro  4.4<L>  /  Alb  2.4<L>  /  TBili  0.9  /  DBili  x   /  AST  11  /  ALT  12  /  AlkPhos  36<L>  -22    PT/INR - ( 21 May 2020 19:34 )   PT: 14.4 sec;   INR: 1.27 ratio         PTT - ( 21 May 2020 19:34 )  PTT:27.5 sec  Urinalysis Basic - ( 22 May 2020 03:03 )    Color: Yellow / Appearance: Clear / S.015 / pH: x  Gluc: x / Ketone: Negative  / Bili: Negative / Urobili: Negative   Blood: x / Protein: Negative / Nitrite: Negative   Leuk Esterase: Negative / RBC: x / WBC x   Sq Epi: x / Non Sq Epi: x / Bacteria: x      CAPILLARY BLOOD GLUCOSE

## 2020-05-24 LAB
ANION GAP SERPL CALC-SCNC: 5 MMOL/L — SIGNIFICANT CHANGE UP (ref 5–17)
BLD GP AB SCN SERPL QL: SIGNIFICANT CHANGE UP
BUN SERPL-MCNC: 9 MG/DL — SIGNIFICANT CHANGE UP (ref 7–18)
CALCIUM SERPL-MCNC: 8 MG/DL — LOW (ref 8.4–10.5)
CHLORIDE SERPL-SCNC: 109 MMOL/L — HIGH (ref 96–108)
CO2 SERPL-SCNC: 29 MMOL/L — SIGNIFICANT CHANGE UP (ref 22–31)
CREAT SERPL-MCNC: 0.72 MG/DL — SIGNIFICANT CHANGE UP (ref 0.5–1.3)
GLUCOSE SERPL-MCNC: 105 MG/DL — HIGH (ref 70–99)
HCT VFR BLD CALC: 23.7 % — LOW (ref 39–50)
HCT VFR BLD CALC: 24.1 % — LOW (ref 39–50)
HCT VFR BLD CALC: 25.3 % — LOW (ref 39–50)
HGB BLD-MCNC: 8.1 G/DL — LOW (ref 13–17)
HGB BLD-MCNC: 8.2 G/DL — LOW (ref 13–17)
HGB BLD-MCNC: 8.4 G/DL — LOW (ref 13–17)
MCHC RBC-ENTMCNC: 31.1 PG — SIGNIFICANT CHANGE UP (ref 27–34)
MCHC RBC-ENTMCNC: 31.2 PG — SIGNIFICANT CHANGE UP (ref 27–34)
MCHC RBC-ENTMCNC: 31.5 PG — SIGNIFICANT CHANGE UP (ref 27–34)
MCHC RBC-ENTMCNC: 33.2 GM/DL — SIGNIFICANT CHANGE UP (ref 32–36)
MCHC RBC-ENTMCNC: 34 GM/DL — SIGNIFICANT CHANGE UP (ref 32–36)
MCHC RBC-ENTMCNC: 34.2 GM/DL — SIGNIFICANT CHANGE UP (ref 32–36)
MCV RBC AUTO: 91.6 FL — SIGNIFICANT CHANGE UP (ref 80–100)
MCV RBC AUTO: 92.2 FL — SIGNIFICANT CHANGE UP (ref 80–100)
MCV RBC AUTO: 93.7 FL — SIGNIFICANT CHANGE UP (ref 80–100)
NRBC # BLD: 0 /100 WBCS — SIGNIFICANT CHANGE UP (ref 0–0)
PLATELET # BLD AUTO: 153 K/UL — SIGNIFICANT CHANGE UP (ref 150–400)
PLATELET # BLD AUTO: 154 K/UL — SIGNIFICANT CHANGE UP (ref 150–400)
PLATELET # BLD AUTO: 177 K/UL — SIGNIFICANT CHANGE UP (ref 150–400)
POTASSIUM SERPL-MCNC: 3.8 MMOL/L — SIGNIFICANT CHANGE UP (ref 3.5–5.3)
POTASSIUM SERPL-SCNC: 3.8 MMOL/L — SIGNIFICANT CHANGE UP (ref 3.5–5.3)
RBC # BLD: 2.57 M/UL — LOW (ref 4.2–5.8)
RBC # BLD: 2.63 M/UL — LOW (ref 4.2–5.8)
RBC # BLD: 2.7 M/UL — LOW (ref 4.2–5.8)
RBC # FLD: 15.9 % — HIGH (ref 10.3–14.5)
RBC # FLD: 16.3 % — HIGH (ref 10.3–14.5)
RBC # FLD: 16.4 % — HIGH (ref 10.3–14.5)
SODIUM SERPL-SCNC: 143 MMOL/L — SIGNIFICANT CHANGE UP (ref 135–145)
WBC # BLD: 5.23 K/UL — SIGNIFICANT CHANGE UP (ref 3.8–10.5)
WBC # BLD: 5.3 K/UL — SIGNIFICANT CHANGE UP (ref 3.8–10.5)
WBC # BLD: 6.39 K/UL — SIGNIFICANT CHANGE UP (ref 3.8–10.5)
WBC # FLD AUTO: 5.23 K/UL — SIGNIFICANT CHANGE UP (ref 3.8–10.5)
WBC # FLD AUTO: 5.3 K/UL — SIGNIFICANT CHANGE UP (ref 3.8–10.5)
WBC # FLD AUTO: 6.39 K/UL — SIGNIFICANT CHANGE UP (ref 3.8–10.5)

## 2020-05-24 PROCEDURE — 99231 SBSQ HOSP IP/OBS SF/LOW 25: CPT

## 2020-05-24 RX ADMIN — PANTOPRAZOLE SODIUM 10 MG/HR: 20 TABLET, DELAYED RELEASE ORAL at 05:25

## 2020-05-24 RX ADMIN — PANTOPRAZOLE SODIUM 10 MG/HR: 20 TABLET, DELAYED RELEASE ORAL at 21:23

## 2020-05-24 NOTE — PROGRESS NOTE ADULT - SUBJECTIVE AND OBJECTIVE BOX
Patient is a 55y old  Male who presents with a chief complaint of Dizziness (24 May 2020 10:05)      INTERVAL HPI/OVERNIGHT EVENTS: no new complaints    MEDICATIONS  (STANDING):  pantoprazole Infusion 8 mG/Hr (10 mL/Hr) IV Continuous <Continuous>    MEDICATIONS  (PRN):      __________________________________________________  REVIEW OF SYSTEMS:    CONSTITUTIONAL: No fever,   EYES: no acute visual disturbances  NECK: No pain or stiffness  RESPIRATORY: No cough; No shortness of breath  CARDIOVASCULAR: No chest pain, no palpitations  GASTROINTESTINAL: No pain. No nausea or vomiting; No diarrhea   NEUROLOGICAL: No headache or numbness, no tremors  MUSCULOSKELETAL: No joint pain, no muscle pain  GENITOURINARY: no dysuria, no frequency, no hesitancy  PSYCHIATRY: no depression , no anxiety  ALL OTHER  ROS negative        Vital Signs Last 24 Hrs  T(C): 36.6 (24 May 2020 16:16), Max: 36.9 (23 May 2020 19:30)  T(F): 97.9 (24 May 2020 16:16), Max: 98.4 (23 May 2020 19:30)  HR: 54 (24 May 2020 16:16) (53 - 64)  BP: 104/60 (24 May 2020 16:16) (104/60 - 116/68)  BP(mean): --  RR: 16 (24 May 2020 16:16) (16 - 18)  SpO2: 96% (24 May 2020 16:16) (96% - 98%)    ________________________________________________  PHYSICAL EXAM:  GENERAL: NAD  HEENT: Normocephalic;  conjunctivae and sclerae clear; moist mucous membranes;   NECK : supple  CHEST/LUNG: Clear to auscultation bilaterally with good air entry   HEART: S1 S2  regular; no murmurs, gallops or rubs  ABDOMEN: Soft, Nontender, Nondistended; Bowel sounds present  EXTREMITIES: no cyanosis; no edema; no calf tenderness  SKIN: warm and dry; no rash  NERVOUS SYSTEM:  Awake and alert; Oriented  to place, person and time ; no new deficits    _________________________________________________  LABS:                        8.2    5.30  )-----------( 154      ( 24 May 2020 06:37 )             24.1     05-24    143  |  109<H>  |  9   ----------------------------<  105<H>  3.8   |  29  |  0.72    Ca    8.0<L>      24 May 2020 06:37          CAPILLARY BLOOD GLUCOSE            RADIOLOGY & ADDITIONAL TESTS:    Imaging Personally Reviewed:  YES/NO    Consultant(s) Notes Reviewed:   YES/ No    Care Discussed with Consultants :     Plan of care was discussed with patient and /or primary care giver; all questions and concerns were addressed and care was aligned with patient's wishes. Patient is a 55y old  Male who presents with a chief complaint of Dizziness (24 May 2020 10:05)    [facility] [id number]    INTERVAL HPI/OVERNIGHT EVENTS:  Reports some lightheadedness when moving about the room. No melena/ bright red blood per rectum. Denies any bowel movement since admission. Would like to eat some solid food, however, was agreeable when discussed why he was being maintained on a clear liquid diet.     MEDICATIONS  (STANDING):  pantoprazole Infusion 8 mG/Hr (10 mL/Hr) IV Continuous <Continuous>    MEDICATIONS  (PRN):      __________________________________________________  REVIEW OF SYSTEMS:    CONSTITUTIONAL: No fever,   EYES: no acute visual disturbances  NECK: No pain or stiffness  RESPIRATORY: No cough; No shortness of breath  CARDIOVASCULAR: No chest pain, no palpitations  GASTROINTESTINAL: No pain. No nausea or vomiting; No diarrhea   NEUROLOGICAL: No headache or numbness, no tremors  MUSCULOSKELETAL: No joint pain, no muscle pain  GENITOURINARY: no dysuria, no frequency, no hesitancy  PSYCHIATRY: no depression , no anxiety  ALL OTHER  ROS negative        Vital Signs Last 24 Hrs  T(C): 36.6 (24 May 2020 16:16), Max: 36.9 (23 May 2020 19:30)  T(F): 97.9 (24 May 2020 16:16), Max: 98.4 (23 May 2020 19:30)  HR: 54 (24 May 2020 16:16) (53 - 64)  BP: 104/60 (24 May 2020 16:16) (104/60 - 116/68)  BP(mean): --  RR: 16 (24 May 2020 16:16) (16 - 18)  SpO2: 96% (24 May 2020 16:16) (96% - 98%)    ________________________________________________  PHYSICAL EXAM:  GENERAL: NAD  HEENT: Normocephalic;  conjunctivae and sclerae clear; moist mucous membranes;   NECK : supple  CHEST/LUNG: Clear to auscultation bilaterally with good air entry   HEART: S1 S2  regular; no murmurs, gallops or rubs  ABDOMEN: Soft, Nontender, Nondistended; Bowel sounds present  EXTREMITIES: no cyanosis; no edema; no calf tenderness  SKIN: warm and dry; no rash  NERVOUS SYSTEM:  Awake and alert; Oriented  to place, person and time ; no new deficits    _________________________________________________  LABS:                        8.2    5.30  )-----------( 154      ( 24 May 2020 06:37 )             24.1     05-24    143  |  109<H>  |  9   ----------------------------<  105<H>  3.8   |  29  |  0.72    Ca    8.0<L>      24 May 2020 06:37          CAPILLARY BLOOD GLUCOSE            RADIOLOGY & ADDITIONAL TESTS:    Imaging Personally Reviewed:  YES/NO    Consultant(s) Notes Reviewed:   YES/ No    Care Discussed with Consultants :     Plan of care was discussed with patient and /or primary care giver; all questions and concerns were addressed and care was aligned with patient's wishes.

## 2020-05-24 NOTE — PROGRESS NOTE ADULT - SUBJECTIVE AND OBJECTIVE BOX
Patient is a 55y old  Male who presents with a chief complaint of Dizziness (22 May 2020 08:17)    Patient is 55 year old male with hx of gout came to ED for bloody stool for 2-3 days.  per pt he started to have blood stool 3 days ago, 2 episode a day. Pt also had 2 episodes vomiting with blood. today pt was feeling weak and fainted as well but he did not fall as he sat down with support of door which was witnessed by landlord. Pt pt denies having EGD /colonoscopy in the past. Pt never had hx of gi bleed but had a episode of black stool long back. pt was given gout medication 2 weeks ago. but does not remember the medication or pharmacy name. pt states his father and mother had liver cancer. pt works at [employer].   pt's hemoglobin was found 5.1, anemia panel was sent, ppi was given and pt is being transfused 2 prbcs. Denies taking any pain medication.     REVIEW OF SYSTEMS  Constitutional:   No fever, no fatigue, no pallor, no night sweats, no weight loss.  HEENT:   No eye pain, no vision changes, no icterus, no mouth ulcers.  Respiratory:   No shortness of breath, no cough, no respiratory distress.   Cardiovascular:   No chest pain, no palpitations.   Gastrointestinal: No abdominal pain, no nausea, no vomiting , no diarrhea no constipation, no hematochezia, + melena.  Skin:   No rashes, no jaundice, no eczema.   Musculoskeletal:   No joint pain, no swelling, no myalgia.   Neurologic:   No headache, no seizure, no weakness.   Genitourinary:   No dysuria, no decreased urine output.  Psychiatric:  No depression, no anxiety,   Endocrine:   No thyroid disease, no diabetes.  Heme/Lymphatic:   No anemia, no blood transfusions, no lymph node enlargement, no bleeding, no bruising.  ___________________________________________________________________________________________  Allergies    No Known Allergies    Intolerances      MEDICATIONS  (STANDING):  pantoprazole Infusion 8 mG/Hr (10 mL/Hr) IV Continuous <Continuous>    MEDICATIONS  (PRN):      PAST MEDICAL & SURGICAL HISTORY:  No pertinent past medical history  No significant past surgical history    FAMILY HISTORY:    Social History: No hsitory of : Tobacco use, IVDA, EToH  ______________________________________________________________________________________    PHYSICAL EXAM    Daily     Daily Weight in k.1 (22 May 2020 04:15)  BMI:   Change in Weight:  Vital Signs Last 24 Hrs  T(C): 37.4 (22 May 2020 10:30), Max: 37.4 (22 May 2020 10:30)  T(F): 99.3 (22 May 2020 10:30), Max: 99.3 (22 May 2020 10:30)  HR: 70 (22 May 2020 10:30) (70 - 92)  BP: 101/54 (22 May 2020 10:30) (93/55 - 123/70)  BP(mean): --  RR: 18 (22 May 2020 10:30) (17 - 18)  SpO2: 96% (22 May 2020 10:30) (96% - 100%)    General:  Well developed, well nourished, alert and active, no pallor, NAD.  HEENT:    Normal appearance of conjunctiva, ears, nose, lips, oropharynx, and oral mucosa, anicteric.  Neck:  No masses, no asymmetry.  Lymph Nodes:  No lymphadenopathy.   Cardiovascular:  RRR normal S1/S2, no murmur.  Respiratory:  CTA B/L, normal respiratory effort.   Abdominal:   soft, no masses or tenderness, normoactive BS, NT/ND, no HSM.  Extremities:   No clubbing or cyanosis, normal capillary refill, no edema.   Skin:   No rash, jaundice, lesions, eczema.   Musculoskeletal:  No joint swelling, erythema or tenderness.   Neuro: No focal deficits.   Other:   _______________________________________________________________________________________________  Lab Results:                          8.0    6.73  )-----------( 141      ( 22 May 2020 12:42 )             22.7         141  |  112<H>  |  20<H>  ----------------------------<  116<H>  3.7   |  25  |  0.54    Ca    7.2<L>      22 May 2020 04:59  Phos  2.0       Mg     1.9         TPro  4.4<L>  /  Alb  2.4<L>  /  TBili  0.9  /  DBili  x   /  AST  11  /  ALT  12  /  AlkPhos  36<L>      LIVER FUNCTIONS - ( 22 May 2020 04:59 )  Alb: 2.4 g/dL / Pro: 4.4 g/dL / ALK PHOS: 36 U/L / ALT: 12 U/L DA / AST: 11 U/L / GGT: x           PT/INR - ( 21 May 2020 19:34 )   PT: 14.4 sec;   INR: 1.27 ratio         PTT - ( 21 May 2020 19:34 )  PTT:27.5 sec  Triglycerides, Serum: 279 mg/dL ( @ 04:59)    CARDIAC MARKERS ( 21 May 2020 19:05 )  <0.015 ng/mL / x     / x     / x     / x          Stool Results:          RADIOLOGY RESULTS:    SURGICAL PATHOLOGY:

## 2020-05-24 NOTE — PROGRESS NOTE ADULT - PROBLEM SELECTOR PLAN 1
Acute symptomatic blood loss anemia presumed due to upper GI bleed.  CTA negative for active bleeding  Resolving melena  Serial CBC  PRBC if hb <7  Continue PPI  EGD on Tuesday  Clear liquid diet  GI follow up- Dr Burroughs Acute symptomatic blood loss anemia presumed due to upper GI bleed.  hgb/hct stable  CTA negative for active bleeding  Resolving melena  Serial CBC, maintain active type and screen  PRBC if hb <7  Continue PPI  EGD on Tuesday  Clear liquid diet  GI follow up- Dr Burroughs

## 2020-05-25 LAB
ANION GAP SERPL CALC-SCNC: 8 MMOL/L — SIGNIFICANT CHANGE UP (ref 5–17)
BUN SERPL-MCNC: 11 MG/DL — SIGNIFICANT CHANGE UP (ref 7–18)
CALCIUM SERPL-MCNC: 8.5 MG/DL — SIGNIFICANT CHANGE UP (ref 8.4–10.5)
CHLORIDE SERPL-SCNC: 106 MMOL/L — SIGNIFICANT CHANGE UP (ref 96–108)
CO2 SERPL-SCNC: 27 MMOL/L — SIGNIFICANT CHANGE UP (ref 22–31)
CREAT SERPL-MCNC: 0.86 MG/DL — SIGNIFICANT CHANGE UP (ref 0.5–1.3)
GLUCOSE SERPL-MCNC: 104 MG/DL — HIGH (ref 70–99)
HCT VFR BLD CALC: 26.1 % — LOW (ref 39–50)
HGB BLD-MCNC: 8.7 G/DL — LOW (ref 13–17)
MCHC RBC-ENTMCNC: 30.9 PG — SIGNIFICANT CHANGE UP (ref 27–34)
MCHC RBC-ENTMCNC: 33.3 GM/DL — SIGNIFICANT CHANGE UP (ref 32–36)
MCV RBC AUTO: 92.6 FL — SIGNIFICANT CHANGE UP (ref 80–100)
NRBC # BLD: 0 /100 WBCS — SIGNIFICANT CHANGE UP (ref 0–0)
PLATELET # BLD AUTO: 186 K/UL — SIGNIFICANT CHANGE UP (ref 150–400)
POTASSIUM SERPL-MCNC: 3.9 MMOL/L — SIGNIFICANT CHANGE UP (ref 3.5–5.3)
POTASSIUM SERPL-SCNC: 3.9 MMOL/L — SIGNIFICANT CHANGE UP (ref 3.5–5.3)
RBC # BLD: 2.82 M/UL — LOW (ref 4.2–5.8)
RBC # FLD: 15.9 % — HIGH (ref 10.3–14.5)
SARS-COV-2 RNA SPEC QL NAA+PROBE: SIGNIFICANT CHANGE UP
SODIUM SERPL-SCNC: 141 MMOL/L — SIGNIFICANT CHANGE UP (ref 135–145)
WBC # BLD: 5.44 K/UL — SIGNIFICANT CHANGE UP (ref 3.8–10.5)
WBC # FLD AUTO: 5.44 K/UL — SIGNIFICANT CHANGE UP (ref 3.8–10.5)

## 2020-05-25 PROCEDURE — 99232 SBSQ HOSP IP/OBS MODERATE 35: CPT | Mod: GC

## 2020-05-25 RX ORDER — SOD SULF/SODIUM/NAHCO3/KCL/PEG
4000 SOLUTION, RECONSTITUTED, ORAL ORAL ONCE
Refills: 0 | Status: COMPLETED | OUTPATIENT
Start: 2020-05-25 | End: 2020-05-25

## 2020-05-25 RX ADMIN — Medication 4000 MILLILITER(S): at 16:43

## 2020-05-25 RX ADMIN — Medication 20 MILLIGRAM(S): at 21:24

## 2020-05-25 RX ADMIN — PANTOPRAZOLE SODIUM 10 MG/HR: 20 TABLET, DELAYED RELEASE ORAL at 18:46

## 2020-05-25 NOTE — PROGRESS NOTE ADULT - SUBJECTIVE AND OBJECTIVE BOX
PGY1 Note discussed with supervising resident and primary attending.    Patient is a 55y old  Male who presents with a chief complaint of Dizziness (21 May 2020 22:14)      INTERVAL HPI/OVERNIGHT EVENTS:  Pt seen and examined at bedside with Rafy  [id number]  Case discussed at length. Plan of care explained. All questions answered.   No further episodes of melena or hematemesis overnight  Pt remains hemodynamically stable with no tachycardia on telemetry.   Plan for EGD/Colon tomorrow      MEDICATIONS  (STANDING):  bisacodyl 20 milliGRAM(s) Oral at bedtime  pantoprazole Infusion 8 mG/Hr (10 mL/Hr) IV Continuous <Continuous>  polyethylene glycol/electrolyte Solution. 4000 milliLiter(s) Oral once    MEDICATIONS  (PRN):      Allergies    No Known Allergies    Intolerances        REVIEW OF SYSTEMS:  Negative except as noted in interval history    Vital Signs Last 24 Hrs  T(C): 36.4 (25 May 2020 07:43), Max: 36.9 (24 May 2020 11:05)  T(F): 97.6 (25 May 2020 07:43), Max: 98.4 (24 May 2020 11:05)  HR: 66 (25 May 2020 07:43) (53 - 66)  BP: 109/63 (25 May 2020 07:43) (97/59 - 120/74)  BP(mean): --  RR: 18 (25 May 2020 07:43) (16 - 18)  SpO2: 98% (25 May 2020 07:43) (96% - 98%)    PHYSICAL EXAM:  GENERAL: NAD, well-groomed, well-developed  HEAD:  Atraumatic, Normocephalic  EYES: EOMI, PERRLA, conjunctiva and sclera clear  NECK: Supple, No JVD, Normal thyroid  CHEST/LUNG: Clear to percussion bilaterally; No rales, rhonchi, wheezing, or rubs  HEART: Regular rate and rhythm; No murmurs, rubs, or gallops  ABDOMEN: Soft, Nontender, Nondistended; Bowel sounds present  NERVOUS SYSTEM:  Alert & Oriented X3, Good concentration; Motor Strength 5/5 B/L   EXTREMITIES:  2+ Peripheral Pulses, No clubbing, cyanosis, or edema        CBC Full  -  ( 25 May 2020 06:16 )  WBC Count : 5.44 K/uL  RBC Count : 2.82 M/uL  Hemoglobin : 8.7 g/dL  Hematocrit : 26.1 %  Platelet Count - Automated : 186 K/uL  Mean Cell Volume : 92.6 fl  Mean Cell Hemoglobin : 30.9 pg  Mean Cell Hemoglobin Concentration : 33.3 gm/dL  Auto Neutrophil # : x  Auto Lymphocyte # : x  Auto Monocyte # : x  Auto Eosinophil # : x  Auto Basophil # : x  Auto Neutrophil % : x  Auto Lymphocyte % : x  Auto Monocyte % : x  Auto Eosinophil % : x  Auto Basophil % : x     PTT - ( 21 May 2020 19:34 )  PTT:27.5 sec    Urinalysis Basic - ( 22 May 2020 03:03 )    Color: Yellow / Appearance: Clear / S.015 / pH: x  Gluc: x / Ketone: Negative  / Bili: Negative / Urobili: Negative   Blood: x / Protein: Negative / Nitrite: Negative   Leuk Esterase: Negative / RBC: x / WBC x   Sq Epi: x / Non Sq Epi: x / Bacteria: x      CAPILLARY BLOOD GLUCOSE      POCT Blood Glucose.: 150 mg/dL (21 May 2020 17:39)      RADIOLOGY & ADDITIONAL TESTS:    Imaging Personally Reviewed:  [ ] YES  [ ] NO    Consultant(s) Notes Reviewed:  [ ] YES  [ ] NO

## 2020-05-25 NOTE — PROGRESS NOTE ADULT - SUBJECTIVE AND OBJECTIVE BOX
Patient is a 55y old  Male who presents with a chief complaint of Dizziness (22 May 2020 08:17)    Patient is 55 year old male with hx of gout came to ED for bloody stool for 2-3 days.  per pt he started to have blood stool 3 days ago, 2 episode a day. Pt also had 2 episodes vomiting with blood. today pt was feeling weak and fainted as well but he did not fall as he sat down with support of door which was witnessed by landlord. Pt pt denies having EGD /colonoscopy in the past. Pt never had hx of gi bleed but had a episode of black stool long back. pt was given gout medication 2 weeks ago. but does not remember the medication or pharmacy name. pt states his father and mother had liver cancer. pt works at [employer].   pt's hemoglobin was found 5.1, anemia panel was sent, ppi was given and pt is being transfused 2 prbcs. Denies taking any pain medication.     REVIEW OF SYSTEMS  Constitutional:   No fever, no fatigue, no pallor, no night sweats, no weight loss.  HEENT:   No eye pain, no vision changes, no icterus, no mouth ulcers.  Respiratory:   No shortness of breath, no cough, no respiratory distress.   Cardiovascular:   No chest pain, no palpitations.   Gastrointestinal: No abdominal pain, no nausea, no vomiting , no diarrhea no constipation, no hematochezia, + melena.  Skin:   No rashes, no jaundice, no eczema.   Musculoskeletal:   No joint pain, no swelling, no myalgia.   Neurologic:   No headache, no seizure, no weakness.   Genitourinary:   No dysuria, no decreased urine output.  Psychiatric:  No depression, no anxiety,   Endocrine:   No thyroid disease, no diabetes.  Heme/Lymphatic:   No anemia, no blood transfusions, no lymph node enlargement, no bleeding, no bruising.  ___________________________________________________________________________________________  Allergies    No Known Allergies    Intolerances      MEDICATIONS  (STANDING):  pantoprazole Infusion 8 mG/Hr (10 mL/Hr) IV Continuous <Continuous>    MEDICATIONS  (PRN):      PAST MEDICAL & SURGICAL HISTORY:  No pertinent past medical history  No significant past surgical history    FAMILY HISTORY:    Social History: No hsitory of : Tobacco use, IVDA, EToH  ______________________________________________________________________________________    PHYSICAL EXAM    Daily     Daily Weight in k.1 (22 May 2020 04:15)  BMI:   Change in Weight:  Vital Signs Last 24 Hrs  T(C): 37.4 (22 May 2020 10:30), Max: 37.4 (22 May 2020 10:30)  T(F): 99.3 (22 May 2020 10:30), Max: 99.3 (22 May 2020 10:30)  HR: 70 (22 May 2020 10:30) (70 - 92)  BP: 101/54 (22 May 2020 10:30) (93/55 - 123/70)  BP(mean): --  RR: 18 (22 May 2020 10:30) (17 - 18)  SpO2: 96% (22 May 2020 10:30) (96% - 100%)    General:  Well developed, well nourished, alert and active, no pallor, NAD.  HEENT:    Normal appearance of conjunctiva, ears, nose, lips, oropharynx, and oral mucosa, anicteric.  Neck:  No masses, no asymmetry.  Lymph Nodes:  No lymphadenopathy.   Cardiovascular:  RRR normal S1/S2, no murmur.  Respiratory:  CTA B/L, normal respiratory effort.   Abdominal:   soft, no masses or tenderness, normoactive BS, NT/ND, no HSM.  Extremities:   No clubbing or cyanosis, normal capillary refill, no edema.   Skin:   No rash, jaundice, lesions, eczema.   Musculoskeletal:  No joint swelling, erythema or tenderness.   Neuro: No focal deficits.   Other:   _______________________________________________________________________________________________  Lab Results:                          8.0    6.73  )-----------( 141      ( 22 May 2020 12:42 )             22.7         141  |  112<H>  |  20<H>  ----------------------------<  116<H>  3.7   |  25  |  0.54    Ca    7.2<L>      22 May 2020 04:59  Phos  2.0       Mg     1.9         TPro  4.4<L>  /  Alb  2.4<L>  /  TBili  0.9  /  DBili  x   /  AST  11  /  ALT  12  /  AlkPhos  36<L>      LIVER FUNCTIONS - ( 22 May 2020 04:59 )  Alb: 2.4 g/dL / Pro: 4.4 g/dL / ALK PHOS: 36 U/L / ALT: 12 U/L DA / AST: 11 U/L / GGT: x           PT/INR - ( 21 May 2020 19:34 )   PT: 14.4 sec;   INR: 1.27 ratio         PTT - ( 21 May 2020 19:34 )  PTT:27.5 sec  Triglycerides, Serum: 279 mg/dL ( @ 04:59)    CARDIAC MARKERS ( 21 May 2020 19:05 )  <0.015 ng/mL / x     / x     / x     / x          Stool Results:          RADIOLOGY RESULTS:    SURGICAL PATHOLOGY:

## 2020-05-25 NOTE — PROGRESS NOTE ADULT - PROBLEM SELECTOR PLAN 1
Hemoglobin 5.1, likely gi bleed   FOBT +  s/p 3 PRBC  Hemoglobin now stable x3 days. Will trend CBC daily for now.  anemia panel is not consistent with iron deficiency anemia, Normocytic with normal iron studies  on ppi drip  No evidence of active bleed  EGD/Colonoscopy tomorrow. Prep tonight  GI consult - Dr. Burroughs Hemoglobin now stable x3 days. Will trend CBC daily for now.  anemia panel reviewed-  Normocytic with normal iron studies  Continue PPI drip  No evidence of active bleed  EGD/Colonoscopy tomorrow. Prep tonight  GI consult - Dr. Burroughs

## 2020-05-25 NOTE — PROGRESS NOTE ADULT - ATTENDING COMMENTS
Patient was seen and examined by myself. Case was discussed with house staff in details. I have reviewed and agree with the plan as outlined above with edits where appropriate.    Vital Signs Last 24 Hrs  T(C): 36.8 (25 May 2020 10:55), Max: 36.9 (25 May 2020 04:30)  T(F): 98.2 (25 May 2020 10:55), Max: 98.4 (25 May 2020 04:30)  HR: 67 (25 May 2020 10:55) (54 - 67)  BP: 104/60 (25 May 2020 10:55) (97/59 - 120/74)  RR: 18 (25 May 2020 10:55) (16 - 18)  SpO2: 97% (25 May 2020 10:55) (96% - 98%)                          8.7    5.44  )-----------( 186      ( 25 May 2020 06:16 )             26.1     05-25    141  |  106  |  11  ----------------------------<  104<H>  3.9   |  27  |  0.86    Ca    8.5      25 May 2020 06:16      A/P: A/P: 54 y/o M with   1. acute blood loss anemia   2. GI bleed presumed upper GI bleed  3. Hypotension likely due to volume loss from GI bleed  4. Hypoalbuminemia    - Hb stable > 8; monitor cbc  - PRN PRBC transfusion   - Monitor vitals closely  - GI  follow up  - Prep for EGD/colonoscopy in am Patient was seen and examined by myself. Case was discussed with house staff in details. I have reviewed and agree with the plan as outlined above with edits where appropriate.    Vital Signs Last 24 Hrs  T(C): 36.8 (25 May 2020 10:55), Max: 36.9 (25 May 2020 04:30)  T(F): 98.2 (25 May 2020 10:55), Max: 98.4 (25 May 2020 04:30)  HR: 67 (25 May 2020 10:55) (54 - 67)  BP: 104/60 (25 May 2020 10:55) (97/59 - 120/74)  RR: 18 (25 May 2020 10:55) (16 - 18)  SpO2: 97% (25 May 2020 10:55) (96% - 98%)                          8.7    5.44  )-----------( 186      ( 25 May 2020 06:16 )             26.1     05-25    141  |  106  |  11  ----------------------------<  104<H>  3.9   |  27  |  0.86    Ca    8.5      25 May 2020 06:16      A/P: A/P: 56 y/o M with   1. acute blood loss anemia   2. GI bleed presumed upper GI bleed  3. Hypotension likely due to volume loss from GI bleed  4. Hypoalbuminemia    - Hb stable > 8; monitor cbc  - PRN PRBC transfusion   - Monitor vitals closely  - GI  follow up ongoing  - Prep for EGD/colonoscopy in am.  Possible discharge after endoscopy tomorrow if stable

## 2020-05-26 LAB
ANION GAP SERPL CALC-SCNC: 5 MMOL/L — SIGNIFICANT CHANGE UP (ref 5–17)
APTT BLD: 34.1 SEC — SIGNIFICANT CHANGE UP (ref 27.5–36.3)
BLD GP AB SCN SERPL QL: SIGNIFICANT CHANGE UP
BUN SERPL-MCNC: 12 MG/DL — SIGNIFICANT CHANGE UP (ref 7–18)
CALCIUM SERPL-MCNC: 8.6 MG/DL — SIGNIFICANT CHANGE UP (ref 8.4–10.5)
CHLORIDE SERPL-SCNC: 106 MMOL/L — SIGNIFICANT CHANGE UP (ref 96–108)
CO2 SERPL-SCNC: 29 MMOL/L — SIGNIFICANT CHANGE UP (ref 22–31)
CREAT SERPL-MCNC: 0.9 MG/DL — SIGNIFICANT CHANGE UP (ref 0.5–1.3)
CULTURE RESULTS: SIGNIFICANT CHANGE UP
CULTURE RESULTS: SIGNIFICANT CHANGE UP
GLUCOSE SERPL-MCNC: 103 MG/DL — HIGH (ref 70–99)
HCT VFR BLD CALC: 26.8 % — LOW (ref 39–50)
HGB BLD-MCNC: 9 G/DL — LOW (ref 13–17)
INR BLD: 1.16 RATIO — SIGNIFICANT CHANGE UP (ref 0.88–1.16)
MCHC RBC-ENTMCNC: 31.6 PG — SIGNIFICANT CHANGE UP (ref 27–34)
MCHC RBC-ENTMCNC: 33.6 GM/DL — SIGNIFICANT CHANGE UP (ref 32–36)
MCV RBC AUTO: 94 FL — SIGNIFICANT CHANGE UP (ref 80–100)
NRBC # BLD: 0 /100 WBCS — SIGNIFICANT CHANGE UP (ref 0–0)
PLATELET # BLD AUTO: 210 K/UL — SIGNIFICANT CHANGE UP (ref 150–400)
POTASSIUM SERPL-MCNC: 3.7 MMOL/L — SIGNIFICANT CHANGE UP (ref 3.5–5.3)
POTASSIUM SERPL-SCNC: 3.7 MMOL/L — SIGNIFICANT CHANGE UP (ref 3.5–5.3)
PROTHROM AB SERPL-ACNC: 13.2 SEC — HIGH (ref 10–12.9)
RBC # BLD: 2.85 M/UL — LOW (ref 4.2–5.8)
RBC # FLD: 15.5 % — HIGH (ref 10.3–14.5)
SODIUM SERPL-SCNC: 140 MMOL/L — SIGNIFICANT CHANGE UP (ref 135–145)
SPECIMEN SOURCE: SIGNIFICANT CHANGE UP
SPECIMEN SOURCE: SIGNIFICANT CHANGE UP
WBC # BLD: 5.19 K/UL — SIGNIFICANT CHANGE UP (ref 3.8–10.5)
WBC # FLD AUTO: 5.19 K/UL — SIGNIFICANT CHANGE UP (ref 3.8–10.5)

## 2020-05-26 PROCEDURE — 99233 SBSQ HOSP IP/OBS HIGH 50: CPT | Mod: GC

## 2020-05-26 PROCEDURE — 88312 SPECIAL STAINS GROUP 1: CPT | Mod: 26

## 2020-05-26 PROCEDURE — 74177 CT ABD & PELVIS W/CONTRAST: CPT | Mod: 26

## 2020-05-26 PROCEDURE — 88305 TISSUE EXAM BY PATHOLOGIST: CPT | Mod: 26

## 2020-05-26 RX ORDER — PANTOPRAZOLE SODIUM 20 MG/1
40 TABLET, DELAYED RELEASE ORAL DAILY
Refills: 0 | Status: DISCONTINUED | OUTPATIENT
Start: 2020-05-26 | End: 2020-05-27

## 2020-05-26 RX ORDER — SODIUM CHLORIDE 9 MG/ML
1000 INJECTION, SOLUTION INTRAVENOUS
Refills: 0 | Status: DISCONTINUED | OUTPATIENT
Start: 2020-05-26 | End: 2020-05-26

## 2020-05-26 RX ADMIN — PANTOPRAZOLE SODIUM 40 MILLIGRAM(S): 20 TABLET, DELAYED RELEASE ORAL at 18:28

## 2020-05-26 RX ADMIN — PANTOPRAZOLE SODIUM 10 MG/HR: 20 TABLET, DELAYED RELEASE ORAL at 05:27

## 2020-05-26 NOTE — DISCHARGE NOTE PROVIDER - HOSPITAL COURSE
Patient is 55 year old male with hx of gout came to ED for bloody stool for 2-3 days.    per pt he started to have blood stool 3 days ago, 2 episode a day. Pt also had 2 episodes vomiting with blood. today pt was feeling weak and fainted as well but he did not fall as he sat down with support of door which was witnessed by kinglord. Pt pt denies having EGD /colonoscopy in the past. Pt never had hx of gi bleed but had a episode of black stool long back. pt was given gout medication 2 weeks ago. but does not remember the medication or pharmacy name. pt states his father and mother had liver cancer. pt works at [employer]     pt's hemoglobin was found 5.1, anemia panel was sent, ppi was given and pt is being transfused 2 prbcs. Denies taking any pain medication.         Admitted for symptomatic anemia with concern for GI bleed.     He received PPI drip and required 3 PRBC. CT A abdomen and pelvis was negative for active bleed. His hemoglobin has now been stable for 3 days.         He received esophagoscopy and colonoscopy which showed _______.            He is stable for discharge on PPI and will continue to follow with gastroenterology. Patient is 55 year old male with hx of gout came to ED for bloody stool for 2-3 days.    per pt he started to have blood stool 3 days ago, 2 episode a day. Pt also had 2 episodes vomiting with blood. today pt was feeling weak and fainted as well but he did not fall as he sat down with support of door which was witnessed by kinglord. Pt pt denies having EGD /colonoscopy in the past. Pt never had hx of gi bleed but had a episode of black stool long back. pt was given gout medication 2 weeks ago. but does not remember the medication or pharmacy name. pt states his father and mother had liver cancer. pt works at [employer].     pt's hemoglobin was found 5.1, anemia panel was sent, ppi was given and pt is being transfused 2 prbcs. Denies taking any pain medication.         Admitted for symptomatic anemia with concern for GI bleed.     He received PPI drip and required 3 PRBC. CT A abdomen and pelvis was negative for active bleed. His hemoglobin has now been stable for 3 days.         He received esophagoscopy and colonoscopy which showed no evidence of pathology. CT enterography showed no evidence of bleed or pathology.            He is stable for discharge on PPI and will continue to follow with gastroenterology. Patient is 55 year old male with hx of gout came to ED for bloody stool for 2-3 days.    per pt he started to have blood stool 3 days ago, 2 episode a day. Pt also had 2 episodes vomiting with blood. today pt was feeling weak and fainted as well but he did not fall as he sat down with support of door which was witnessed by kinglord. Pt pt denies having EGD /colonoscopy in the past. Pt never had hx of gi bleed but had a episode of black stool long back. pt was given gout medication 2 weeks ago. but does not remember the medication or pharmacy name. pt states his father and mother had liver cancer. pt works at [email or web address].     pt's hemoglobin was found 5.1, anemia panel was sent, ppi was given and pt is being transfused 2 prbcs. Denies taking any pain medication.         Admitted for symptomatic anemia with concern for GI bleed.     He received PPI drip and required 3 PRBC. CT A abdomen and pelvis was negative for active bleed. His hemoglobin has now been stable for 3 days.         He received esophagoscopy and colonoscopy which showed no evidence of pathology. CT enterography showed no evidence of bleed. CT enterography showed questionable proctitis but direct visualization with colonoscopy showed no evidence of inflammation.             He is stable for discharge on PPI and will continue to follow with gastroenterology for capsule endoscopy as outpatient. Patient is 55 year old male with hx of gout came to ED for bloody stool for 2-3 days.    per pt he started to have blood stool 3 days ago, 2 episode a day. Pt also had 2 episodes vomiting with blood. today pt was feeling weak and fainted as well but he did not fall as he sat down with support of door which was witnessed by kinglord. Pt pt denies having EGD /colonoscopy in the past. Pt never had hx of gi bleed but had a episode of black stool long back. pt was given gout medication 2 weeks ago. but does not remember the medication or pharmacy name. pt states his father and mother had liver cancer. pt works at [employer].     pt's hemoglobin was found 5.1, anemia panel was sent, ppi was given and pt is being transfused 2 prbcs. Denies taking any pain medication.         Admitted for symptomatic anemia with concern for GI bleed.     He received PPI drip and required 3 PRBC. CT A abdomen and pelvis was negative for active bleed. His hemoglobin has now been stable for 3 days.         He received esophagoscopy and colonoscopy which showed no evidence of pathology. CT enterography showed no evidence of bleed. CT enterography showed questionable proctitis but direct visualization with colonoscopy showed no evidence of inflammation.             He is stable for discharge on PPI and will continue to follow with gastroenterology for capsule endoscopy as outpatient.    Follow up with PCP advised

## 2020-05-26 NOTE — DISCHARGE NOTE PROVIDER - CARE PROVIDERS DIRECT ADDRESSES
[email or web address]

## 2020-05-26 NOTE — PROGRESS NOTE ADULT - SUBJECTIVE AND OBJECTIVE BOX
Patient is a 55y old  Male who presents with a chief complaint of Dizziness (22 May 2020 08:17)    Patient is 55 year old male with hx of gout came to ED for bloody stool for 2-3 days.  per pt he started to have blood stool 3 days ago, 2 episode a day. Pt also had 2 episodes vomiting with blood. today pt was feeling weak and fainted as well but he did not fall as he sat down with support of door which was witnessed by landlord. Pt pt denies having EGD /colonoscopy in the past. Pt never had hx of gi bleed but had a episode of black stool long back. pt was given gout medication 2 weeks ago. but does not remember the medication or pharmacy name. pt states his father and mother had liver cancer. pt works at [email or web address].   pt's hemoglobin was found 5.1, anemia panel was sent, ppi was given and pt is being transfused 2 prbcs. Denies taking any pain medication.     REVIEW OF SYSTEMS  Constitutional:   No fever, no fatigue, no pallor, no night sweats, no weight loss.  HEENT:   No eye pain, no vision changes, no icterus, no mouth ulcers.  Respiratory:   No shortness of breath, no cough, no respiratory distress.   Cardiovascular:   No chest pain, no palpitations.   Gastrointestinal: No abdominal pain, no nausea, no vomiting , no diarrhea no constipation, no hematochezia, + melena.  Skin:   No rashes, no jaundice, no eczema.   Musculoskeletal:   No joint pain, no swelling, no myalgia.   Neurologic:   No headache, no seizure, no weakness.   Genitourinary:   No dysuria, no decreased urine output.  Psychiatric:  No depression, no anxiety,   Endocrine:   No thyroid disease, no diabetes.  Heme/Lymphatic:   No anemia, no blood transfusions, no lymph node enlargement, no bleeding, no bruising.  ___________________________________________________________________________________________  Allergies    No Known Allergies    Intolerances      MEDICATIONS  (STANDING):  pantoprazole Infusion 8 mG/Hr (10 mL/Hr) IV Continuous <Continuous>    MEDICATIONS  (PRN):      PAST MEDICAL & SURGICAL HISTORY:  No pertinent past medical history  No significant past surgical history    FAMILY HISTORY:    Social History: No hsitory of : Tobacco use, IVDA, EToH  ______________________________________________________________________________________    PHYSICAL EXAM    Daily     Daily Weight in k.1 (22 May 2020 04:15)  BMI:   Change in Weight:  Vital Signs Last 24 Hrs  T(C): 37.4 (22 May 2020 10:30), Max: 37.4 (22 May 2020 10:30)  T(F): 99.3 (22 May 2020 10:30), Max: 99.3 (22 May 2020 10:30)  HR: 70 (22 May 2020 10:30) (70 - 92)  BP: 101/54 (22 May 2020 10:30) (93/55 - 123/70)  BP(mean): --  RR: 18 (22 May 2020 10:30) (17 - 18)  SpO2: 96% (22 May 2020 10:30) (96% - 100%)    General:  Well developed, well nourished, alert and active, no pallor, NAD.  HEENT:    Normal appearance of conjunctiva, ears, nose, lips, oropharynx, and oral mucosa, anicteric.  Neck:  No masses, no asymmetry.  Lymph Nodes:  No lymphadenopathy.   Cardiovascular:  RRR normal S1/S2, no murmur.  Respiratory:  CTA B/L, normal respiratory effort.   Abdominal:   soft, no masses or tenderness, normoactive BS, NT/ND, no HSM.  Extremities:   No clubbing or cyanosis, normal capillary refill, no edema.   Skin:   No rash, jaundice, lesions, eczema.   Musculoskeletal:  No joint swelling, erythema or tenderness.   Neuro: No focal deficits.   Other:   _______________________________________________________________________________________________  Lab Results:                          8.0    6.73  )-----------( 141      ( 22 May 2020 12:42 )             22.7         141  |  112<H>  |  20<H>  ----------------------------<  116<H>  3.7   |  25  |  0.54    Ca    7.2<L>      22 May 2020 04:59  Phos  2.0       Mg     1.9         TPro  4.4<L>  /  Alb  2.4<L>  /  TBili  0.9  /  DBili  x   /  AST  11  /  ALT  12  /  AlkPhos  36<L>      LIVER FUNCTIONS - ( 22 May 2020 04:59 )  Alb: 2.4 g/dL / Pro: 4.4 g/dL / ALK PHOS: 36 U/L / ALT: 12 U/L DA / AST: 11 U/L / GGT: x           PT/INR - ( 21 May 2020 19:34 )   PT: 14.4 sec;   INR: 1.27 ratio         PTT - ( 21 May 2020 19:34 )  PTT:27.5 sec  Triglycerides, Serum: 279 mg/dL ( @ 04:59)    CARDIAC MARKERS ( 21 May 2020 19:05 )  <0.015 ng/mL / x     / x     / x     / x          Stool Results:          RADIOLOGY RESULTS:    SURGICAL PATHOLOGY:

## 2020-05-26 NOTE — DISCHARGE NOTE PROVIDER - PROVIDER TOKENS
PROVIDER:[TOKEN:[[id number]],FOLLOWUP:[2 weeks]] PROVIDER:[TOKEN:[[id number]],FOLLOWUP:[2 weeks]],FREE:[LAST:[Li],FIRST:[Guerrero],PHONE:[phone]],FAX:[(   )    -],FOLLOWUP:[2 weeks],ESTABLISHEDPATIENT:[T]]

## 2020-05-26 NOTE — PROGRESS NOTE ADULT - PROBLEM SELECTOR PLAN 3
VTE score 0   holding dvt ppx given gi bleed
Venodyne boots while in bed
Venodyne boots while in bed

## 2020-05-26 NOTE — DIETITIAN INITIAL EVALUATION ADULT. - NS FNS WEIGHT USED FOR CALC
Kt=652 cm per pt ( 5' 10" )    JTS=590 lb   Current xz=592.9 lb 5/22/2020; 171.6 lb 5/26/2020, ? wt changes in-house, may due to scale/fluid variance, Usual wt=160 lb per pt/ideal ideal/Hq=405 cm per pt ( 5' 10" )    RNW=040 lb   Current vd=463.9 lb 5/22/2020; 171.6 lb 5/26/2020,  BMI=24.6   ? wt changes in-house, may due to scale/fluid variance, Usual wt=160 lb per pt

## 2020-05-26 NOTE — DISCHARGE NOTE PROVIDER - CARE PROVIDER_API CALL
Catrachito Rogers  GASTROENTEROLOGY  [address] [place] [address]  Phone: [phone]  Fax: [phone]  Follow Up Time: 2 weeks Catrachito Rogers  GASTROENTEROLOGY  [address] [place] [address]  Phone: [phone]  Fax: [phone]  Follow Up Time: 2 weeks    Guerrero Del Castillo  Phone: [phone]  Fax: (   )    -  Established Patient  Follow Up Time: 2 weeks

## 2020-05-26 NOTE — PROGRESS NOTE ADULT - SUBJECTIVE AND OBJECTIVE BOX
PGY1 Note discussed with supervising resident and primary attending.    Patient is a 55y old  Male who presents with a chief complaint of Dizziness (21 May 2020 22:14)      INTERVAL HPI/OVERNIGHT EVENTS:  Pt seen and examined at bedside with Mandarin   Case discussed at length. Plan of care explained. All questions answered.   No further episodes of melena or hematemesis overnight  Pt remains hemodynamically stable with no tachycardia on telemetry.   Requesting early time for EGD/Colonoscopy today  Tolerated prep overnight      MEDICATIONS  (STANDING):  bisacodyl 20 milliGRAM(s) Oral at bedtime  pantoprazole Infusion 8 mG/Hr (10 mL/Hr) IV Continuous <Continuous>  polyethylene glycol/electrolyte Solution. 4000 milliLiter(s) Oral once    MEDICATIONS  (PRN):      Allergies    No Known Allergies    Intolerances        REVIEW OF SYSTEMS:  Negative except as noted in interval history    Vital Signs Last 24 Hrs  T(C): 36.7 (26 May 2020 07:17), Max: 36.9 (25 May 2020 15:50)  T(F): 98.1 (26 May 2020 07:17), Max: 98.4 (25 May 2020 15:50)  HR: 53 (26 May 2020 07:17) (53 - 101)  BP: 108/60 (26 May 2020 07:17) (103/56 - 123/65)  BP(mean): --  RR: 18 (26 May 2020 07:17) (17 - 18)  SpO2: 96% (26 May 2020 07:17) (96% - 100%)    PHYSICAL EXAM:  GENERAL: NAD, well-groomed, well-developed  HEAD:  Atraumatic, Normocephalic  EYES: EOMI, PERRLA, conjunctiva and sclera clear  NECK: Supple, No JVD, Normal thyroid  CHEST/LUNG: Clear to percussion bilaterally; No rales, rhonchi, wheezing, or rubs  HEART: Regular rate and rhythm; No murmurs, rubs, or gallops  ABDOMEN: Soft, Nontender, Nondistended; Bowel sounds present  NERVOUS SYSTEM:  Alert & Oriented X3, Good concentration; Motor Strength 5/5 B/L   EXTREMITIES:  2+ Peripheral Pulses, No clubbing, cyanosis, or edema          LABS:  cret                        9.0    5.19  )-----------( 210      ( 26 May 2020 05:57 )             26.8     05-26    140  |  106  |  12  ----------------------------<  103<H>  3.7   |  29  |  0.90    Ca    8.6      26 May 2020 05:57      PT/INR - ( 26 May 2020 05:57 )   PT: 13.2 sec;   INR: 1.16 ratio         PTT - ( 26 May 2020 05:57 )  PTT:34.1 sec    Urinalysis Basic - ( 22 May 2020 03:03 )    Color: Yellow / Appearance: Clear / S.015 / pH: x  Gluc: x / Ketone: Negative  / Bili: Negative / Urobili: Negative   Blood: x / Protein: Negative / Nitrite: Negative   Leuk Esterase: Negative / RBC: x / WBC x   Sq Epi: x / Non Sq Epi: x / Bacteria: x      CAPILLARY BLOOD GLUCOSE      POCT Blood Glucose.: 150 mg/dL (21 May 2020 17:39)      RADIOLOGY & ADDITIONAL TESTS:    Imaging Personally Reviewed:  [ ] YES  [ ] NO    Consultant(s) Notes Reviewed:  [ ] YES  [ ] NO

## 2020-05-26 NOTE — DIETITIAN INITIAL EVALUATION ADULT. - OTHER INFO
Pt alert, oriented, lives home with family PTA, native Chinese speaking RD able to communicate with pt in Chinese, well-communicated; appetite good PTA, unsure recent wt changes, denied GI distress, chewing or swallowing problem at present, no specific food choices reported  for when diet advanced; NPO/liquid diet x 4 yo 6d, NPO today for Endoscopy; Discussed with MD/RN

## 2020-05-26 NOTE — CHART NOTE - NSCHARTNOTEFT_GEN_A_CORE
Esophagogastroduodenoscopy Report  Indication:   Referring MD:   Instrument:    Anesthesia: MAC  Consent:  Informed consent was obtained from the patient after providing any opportunity for questions  Procedure: The gastroscope was gently passed through the incisoral orifice into the oral cavity and under direct visualization the esophagus was intubated. The endoscope was passed down the esophagus, through the stomach and into proximal duodenum . Color, texture, mucosa and anatomy of the esophagus, stomach, and duodenum were carefully examined with the scope. The patient tolerated the procedure well. After completion of the examination, the patient was transferred to the recovery room.   Preparation: NPO   Findings:   Oropharynx	Normal  Esophagus	Normal  EG-junction	Normal  Cardia	Normal.  Body	Mild erythema and edema  Biopsy taken   Antrum	Mild erythema and edema  Biopsy taken   Pylorus	Normal.  Duodenal Bulb	Normal   2nd portion	Normal  3rd portion	Normal.  Date and time:11/27/2019 9:20:48 AM  EBL:0  Impression:     Plan: 1- Mild gastritis        Colonoscopy Report  Indication: Abnormal Imaging   Referring:   Instrument:    Anesthesia: MAC  Consent:  Informed consent was obtained from the patient after providing any opportunity for questions  Procedure: After placing the patient in the left lateral decubitus position, the colonoscope was gently inserted into the rectum and advanced to the cecum. Color, texture, mucosa, and anatomy of the colon were carefully examined with the scope. The patient tolerated the procedure well. After completion of the exam, the patient was transferred to the recovery room.     Preparation:  Findings:   Anal Canal	Normal  Rectum	Normal  Sigmoid Colon 	Normal  Descending Colon	Normal  Splenic Flexure	Normal  Transverse Colon	Normal  Hepatic Flexure	Normal  Ascending Colon	 Normal  Cecum	Normal  Ileo-cecal Valve	Normal  Ileum 	  Date and time: 11/27/2019 9:21 AM  EBL:0    Impression:  1- Unremarkable Ileo-colonoscopy   No pathology to explain anemia   Plan: Advance diet 2- Hematology consult 3- obtain CT enterography                     Procedure Start Time:    Cecum Reached Time:   Procedure End Time:     Total Withdrawal Time:                                 Attending:       Catrachito Rogers M.D.   Date and Time: 11/27/2019 9:21:57 AM                                      Procedure Start Time:   Procedure End Time:                                     Attending:       Catrachito Rogers M.D.   Date and Time: 11/27/2019 9:20:48 AM

## 2020-05-26 NOTE — PROGRESS NOTE ADULT - ATTENDING COMMENTS
Patient was seen and examined by myself. Case was discussed with house staff in details. I have reviewed and agree with the plan as outlined above with edits where appropriate.    Vital Signs Last 24 Hrs  T(C): 36.4 (26 May 2020 20:10), Max: 36.8 (26 May 2020 04:57)  T(F): 97.5 (26 May 2020 20:10), Max: 98.3 (26 May 2020 04:57)  HR: 79 (26 May 2020 20:10) (53 - 79)  BP: 104/58 (26 May 2020 20:10) (92/59 - 112/69)  BP(mean): 83 (26 May 2020 14:22) (83 - 83)  RR: 18 (26 May 2020 20:10) (14 - 20)  SpO2: 99% (26 May 2020 20:10) (95% - 100%)                          9.0    5.19  )-----------( 210      ( 26 May 2020 05:57 )             26.8       05-26    140  |  106  |  12  ----------------------------<  103<H>  3.7   |  29  |  0.90    Ca    8.6      26 May 2020 05:57      A/P: Acute blood loss anemia likely GI bleed -  EGD /coloscopy unrevealing although area of erythema in gastrum- possible gastritis  - Ct enterography suggested per GI. Ordered  Monitor hb  If remains clinically stable, plan discharge in am.  Continue PPI - switch to oral.  Advanced diet.  Plan of care discussed with patient via Mandarin ; all questions were answered and his concerns were addressed..

## 2020-05-26 NOTE — DIETITIAN INITIAL EVALUATION ADULT. - PERTINENT LABORATORY DATA
05-26 Na140 mmol/L Glu 103 mg/dL<H> K+ 3.7 mmol/L Cr  0.90 mg/dL BUN 12 mg/dL   05-22 Phos 2.0 mg/dL<L>   05-22 Alb 2.4 g/dL<L>       05-22 Chol 96 mg/dL LDL 18 mg/dL HDL 22 mg/dL<L> Trig 279 mg/dL<H>

## 2020-05-26 NOTE — PROGRESS NOTE ADULT - ASSESSMENT
55 year old male hx of gout came in with symptomatic anemia.
Anemia;  Needs resuscitation   Transfuse to a goal hemoglobin around 8   IV PPI   Monitor CBC Q24 hours   High concern for gastric CA   Please obtain CT of the abdomen pelvis with contrast Colon and EGD scheduled for Tuesday   PLEASE UPDATE COVID STATUS TODAY    Ok for diet today and then clears tomorrow   I was physically present for the key portions of the evaluation and management (E/M) service provided.  The patient was personally seen and examined at bedside.    Thank you for your consultation and allowing  me to participate in the care of your patients. If you have further questions please contact me at [phone].     Catrachito Patton M.D.       _________________________________________________________________________________________________  [facility]  [address] [place] [address]  Office: [phone]    Kwasi Smith PA-C  ___________________________________________________________________________________________________
Anemia;  Needs resuscitation   Transfuse to a goal hemoglobin around 8   IV PPI   Monitor CBC Q24 hours   High concern for gastric CA   Please obtain CT of the abdomen pelvis with contrast Colon and EGD scheduled for Tuesday   PLEASE UPDATE COVID STATUS TODAY    Ok for diet today and then clears tomorrow   I was physically present for the key portions of the evaluation and management (E/M) service provided.  The patient was personally seen and examined at bedside.    Thank you for your consultation and allowing  me to participate in the care of your patients. If you have further questions please contact me at [phone].     Catrachito Patton M.D.       _________________________________________________________________________________________________  [facility]  [address] [place] [address]  Office: [phone]    Kwasi Smith PA-C  ___________________________________________________________________________________________________
Anemia;  Needs resuscitation   Transfuse to a goal hemoglobin around 8   IV PPI   Monitor CBC Q12 hours   High concern for gastric CA   Please obtain CT of the abdomen pelvis with contrast Colon and EGD scheduled for Tuesday   PLEASE UPDATE COVID STATUS ON MONDAY   Ok for diet today and then clears tomorrow   I was physically present for the key portions of the evaluation and management (E/M) service provided.  The patient was personally seen and examined at bedside.    Thank you for your consultation and allowing  me to participate in the care of your patients. If you have further questions please contact me at [phone].     Catrachito Patton M.D.       _________________________________________________________________________________________________  [facility]  [address] [place] [address]  Office: [phone]    Kwasi Smith PA-C  ___________________________________________________________________________________________________

## 2020-05-26 NOTE — PROGRESS NOTE ADULT - PROBLEM SELECTOR PLAN 1
Hemoglobin now stable x3 days. Will trend CBC daily for now.  anemia panel reviewed-  Normocytic with normal iron studies  Continue PPI drip  No evidence of active bleed  EGD/Colonoscopy today. Tolerated prep overnight  GI consult - Dr. Burroughs

## 2020-05-26 NOTE — DIETITIAN INITIAL EVALUATION ADULT. - PROBLEM SELECTOR PLAN 1
Hemoglobin 5.1, likely gi bleed   not sure upper gi or lower gi   so far anemia panel is not consistent with iron deficiency anemia, f/u ferritin   s/p ppi   on ppi q12  getting 2 prbcs   f/u CT A abdomen r/o acitve bleed   GI consult per MD

## 2020-05-26 NOTE — DISCHARGE NOTE PROVIDER - NSDCCPCAREPLAN_GEN_ALL_CORE_FT
PRINCIPAL DISCHARGE DIAGNOSIS  Diagnosis: Gastrointestinal hemorrhage with melena  Assessment and Plan of Treatment: You were admitted for severe symptomatic anemia with concern for GI bleed. You recieved 3 transfusions and your hemoglobin has now been stable with no further evidence of bleed. Esophagoscopy and colonoscopy were completed showing _____.      SECONDARY DISCHARGE DIAGNOSES  Diagnosis: Syncope, unspecified syncope type  Assessment and Plan of Treatment: As above    Diagnosis: Severe anemia  Assessment and Plan of Treatment: As above PRINCIPAL DISCHARGE DIAGNOSIS  Diagnosis: Gastrointestinal hemorrhage with melena  Assessment and Plan of Treatment: You were admitted for severe symptomatic anemia with concern for GI bleed. You recieved 3 transfusions and your hemoglobin has now been stable with no further evidence of bleed. Esophagoscopy and colonoscopy were completed showing no acute pathology or source of bleed. CT enterography was unrevealing as to a source of bleed. You will continue to follow with gastroenterology and your primary care provider as outpatient. PRINCIPAL DISCHARGE DIAGNOSIS  Diagnosis: Gastrointestinal hemorrhage with melena  Assessment and Plan of Treatment: You were admitted for severe symptomatic anemia with concern for GI bleed. You recieved 3 transfusions and your hemoglobin has now been stable with no further evidence of bleed. Esophagoscopy and colonoscopy were completed showing no acute pathology or source of bleed. CT enterography was unrevealing as to a source of bleed. You will continue to follow with gastroenterology and your primary care provider as outpatient. You are recommended for capsule endoscopy as outpatient.

## 2020-05-27 VITALS
SYSTOLIC BLOOD PRESSURE: 103 MMHG | OXYGEN SATURATION: 99 % | RESPIRATION RATE: 18 BRPM | HEART RATE: 69 BPM | TEMPERATURE: 98 F | DIASTOLIC BLOOD PRESSURE: 58 MMHG

## 2020-05-27 LAB
ANION GAP SERPL CALC-SCNC: 5 MMOL/L — SIGNIFICANT CHANGE UP (ref 5–17)
BUN SERPL-MCNC: 10 MG/DL — SIGNIFICANT CHANGE UP (ref 7–18)
CALCIUM SERPL-MCNC: 8.2 MG/DL — LOW (ref 8.4–10.5)
CHLORIDE SERPL-SCNC: 108 MMOL/L — SIGNIFICANT CHANGE UP (ref 96–108)
CO2 SERPL-SCNC: 28 MMOL/L — SIGNIFICANT CHANGE UP (ref 22–31)
CREAT SERPL-MCNC: 0.76 MG/DL — SIGNIFICANT CHANGE UP (ref 0.5–1.3)
GLUCOSE SERPL-MCNC: 101 MG/DL — HIGH (ref 70–99)
HCT VFR BLD CALC: 26.5 % — LOW (ref 39–50)
HGB BLD-MCNC: 8.8 G/DL — LOW (ref 13–17)
MCHC RBC-ENTMCNC: 31.1 PG — SIGNIFICANT CHANGE UP (ref 27–34)
MCHC RBC-ENTMCNC: 33.2 GM/DL — SIGNIFICANT CHANGE UP (ref 32–36)
MCV RBC AUTO: 93.6 FL — SIGNIFICANT CHANGE UP (ref 80–100)
NRBC # BLD: 0 /100 WBCS — SIGNIFICANT CHANGE UP (ref 0–0)
PLATELET # BLD AUTO: 234 K/UL — SIGNIFICANT CHANGE UP (ref 150–400)
POTASSIUM SERPL-MCNC: 4 MMOL/L — SIGNIFICANT CHANGE UP (ref 3.5–5.3)
POTASSIUM SERPL-SCNC: 4 MMOL/L — SIGNIFICANT CHANGE UP (ref 3.5–5.3)
RBC # BLD: 2.83 M/UL — LOW (ref 4.2–5.8)
RBC # FLD: 14.6 % — HIGH (ref 10.3–14.5)
SODIUM SERPL-SCNC: 141 MMOL/L — SIGNIFICANT CHANGE UP (ref 135–145)
WBC # BLD: 4.99 K/UL — SIGNIFICANT CHANGE UP (ref 3.8–10.5)
WBC # FLD AUTO: 4.99 K/UL — SIGNIFICANT CHANGE UP (ref 3.8–10.5)

## 2020-05-27 PROCEDURE — 85610 PROTHROMBIN TIME: CPT

## 2020-05-27 PROCEDURE — 74174 CTA ABD&PLVS W/CONTRAST: CPT

## 2020-05-27 PROCEDURE — 36415 COLL VENOUS BLD VENIPUNCTURE: CPT

## 2020-05-27 PROCEDURE — 84484 ASSAY OF TROPONIN QUANT: CPT

## 2020-05-27 PROCEDURE — 83735 ASSAY OF MAGNESIUM: CPT

## 2020-05-27 PROCEDURE — 85027 COMPLETE CBC AUTOMATED: CPT

## 2020-05-27 PROCEDURE — 84100 ASSAY OF PHOSPHORUS: CPT

## 2020-05-27 PROCEDURE — 82962 GLUCOSE BLOOD TEST: CPT

## 2020-05-27 PROCEDURE — P9040: CPT

## 2020-05-27 PROCEDURE — 80053 COMPREHEN METABOLIC PANEL: CPT

## 2020-05-27 PROCEDURE — 83540 ASSAY OF IRON: CPT

## 2020-05-27 PROCEDURE — 83036 HEMOGLOBIN GLYCOSYLATED A1C: CPT

## 2020-05-27 PROCEDURE — U0003: CPT

## 2020-05-27 PROCEDURE — 99291 CRITICAL CARE FIRST HOUR: CPT | Mod: 25

## 2020-05-27 PROCEDURE — 99238 HOSP IP/OBS DSCHRG MGMT 30/<: CPT | Mod: GC

## 2020-05-27 PROCEDURE — 71045 X-RAY EXAM CHEST 1 VIEW: CPT

## 2020-05-27 PROCEDURE — 86900 BLOOD TYPING SEROLOGIC ABO: CPT

## 2020-05-27 PROCEDURE — 36430 TRANSFUSION BLD/BLD COMPNT: CPT

## 2020-05-27 PROCEDURE — 87635 SARS-COV-2 COVID-19 AMP PRB: CPT

## 2020-05-27 PROCEDURE — 81003 URINALYSIS AUTO W/O SCOPE: CPT

## 2020-05-27 PROCEDURE — 82728 ASSAY OF FERRITIN: CPT

## 2020-05-27 PROCEDURE — 86923 COMPATIBILITY TEST ELECTRIC: CPT

## 2020-05-27 PROCEDURE — 84466 ASSAY OF TRANSFERRIN: CPT

## 2020-05-27 PROCEDURE — 82746 ASSAY OF FOLIC ACID SERUM: CPT

## 2020-05-27 PROCEDURE — 93005 ELECTROCARDIOGRAM TRACING: CPT

## 2020-05-27 PROCEDURE — 82272 OCCULT BLD FECES 1-3 TESTS: CPT

## 2020-05-27 PROCEDURE — 83550 IRON BINDING TEST: CPT

## 2020-05-27 PROCEDURE — 74177 CT ABD & PELVIS W/CONTRAST: CPT

## 2020-05-27 PROCEDURE — 86901 BLOOD TYPING SEROLOGIC RH(D): CPT

## 2020-05-27 PROCEDURE — 82306 VITAMIN D 25 HYDROXY: CPT

## 2020-05-27 PROCEDURE — 80061 LIPID PANEL: CPT

## 2020-05-27 PROCEDURE — 86850 RBC ANTIBODY SCREEN: CPT

## 2020-05-27 PROCEDURE — 88305 TISSUE EXAM BY PATHOLOGIST: CPT

## 2020-05-27 PROCEDURE — 85730 THROMBOPLASTIN TIME PARTIAL: CPT

## 2020-05-27 PROCEDURE — 82607 VITAMIN B-12: CPT

## 2020-05-27 PROCEDURE — 88312 SPECIAL STAINS GROUP 1: CPT

## 2020-05-27 PROCEDURE — 87040 BLOOD CULTURE FOR BACTERIA: CPT

## 2020-05-27 PROCEDURE — 80048 BASIC METABOLIC PNL TOTAL CA: CPT

## 2020-05-27 RX ORDER — PANTOPRAZOLE SODIUM 20 MG/1
1 TABLET, DELAYED RELEASE ORAL
Qty: 30 | Refills: 0
Start: 2020-05-27 | End: 2020-06-25

## 2020-05-27 RX ORDER — SODIUM CHLORIDE 9 MG/ML
1000 INJECTION INTRAMUSCULAR; INTRAVENOUS; SUBCUTANEOUS
Refills: 0 | Status: DISCONTINUED | OUTPATIENT
Start: 2020-05-27 | End: 2020-05-27

## 2020-05-27 RX ORDER — SODIUM CHLORIDE 9 MG/ML
500 INJECTION INTRAMUSCULAR; INTRAVENOUS; SUBCUTANEOUS ONCE
Refills: 0 | Status: COMPLETED | OUTPATIENT
Start: 2020-05-27 | End: 2020-05-27

## 2020-05-27 RX ORDER — SODIUM CHLORIDE 9 MG/ML
500 INJECTION, SOLUTION INTRAVENOUS ONCE
Refills: 0 | Status: COMPLETED | OUTPATIENT
Start: 2020-05-27 | End: 2020-05-27

## 2020-05-27 RX ADMIN — SODIUM CHLORIDE 1000 MILLILITER(S): 9 INJECTION INTRAMUSCULAR; INTRAVENOUS; SUBCUTANEOUS at 09:21

## 2020-05-27 RX ADMIN — PANTOPRAZOLE SODIUM 40 MILLIGRAM(S): 20 TABLET, DELAYED RELEASE ORAL at 12:39

## 2020-05-27 RX ADMIN — SODIUM CHLORIDE 1000 MILLILITER(S): 9 INJECTION, SOLUTION INTRAVENOUS at 01:16

## 2020-05-27 RX ADMIN — SODIUM CHLORIDE 100 MILLILITER(S): 9 INJECTION INTRAMUSCULAR; INTRAVENOUS; SUBCUTANEOUS at 12:39

## 2020-05-27 NOTE — CHART NOTE - NSCHARTNOTEFT_GEN_A_CORE
Nutrition Focus Note:   Discharge diet education requested by pt. Chart reviewed, native Chinese speaking RD able to communicate with pt in Chinese well; Diet education on high Fe nutrition therapy for anemia given ( in Chinese/English), written copy provided with RD business card attached for contact as needed; Pt  very receptive. Also encouraged pt to follow up with PCP/GI  ( etiology for GI bleeding ); Discussed with MD/RN.

## 2020-05-27 NOTE — CHART NOTE - NSCHARTNOTEFT_GEN_A_CORE
BP was noted low at 95/59. NS 500ml bolus was given.  will follow BP overnight.   Primary team to follow in AM

## 2021-03-22 ENCOUNTER — EMERGENCY (EMERGENCY)
Facility: HOSPITAL | Age: 56
LOS: 1 days | Discharge: HOME | End: 2021-03-22
Attending: EMERGENCY MEDICINE
Payer: MEDICAID

## 2021-03-22 VITALS
HEART RATE: 68 BPM | DIASTOLIC BLOOD PRESSURE: 81 MMHG | TEMPERATURE: 98 F | HEIGHT: 69 IN | SYSTOLIC BLOOD PRESSURE: 128 MMHG | RESPIRATION RATE: 18 BRPM | OXYGEN SATURATION: 97 %

## 2021-03-22 VITALS
HEART RATE: 84 BPM | RESPIRATION RATE: 18 BRPM | TEMPERATURE: 98 F | DIASTOLIC BLOOD PRESSURE: 78 MMHG | SYSTOLIC BLOOD PRESSURE: 124 MMHG | OXYGEN SATURATION: 97 %

## 2021-03-22 PROBLEM — Z78.9 OTHER SPECIFIED HEALTH STATUS: Chronic | Status: ACTIVE | Noted: 2020-05-21

## 2021-03-22 LAB
ALBUMIN SERPL ELPH-MCNC: 3.7 G/DL — SIGNIFICANT CHANGE UP (ref 3.5–5)
ALP SERPL-CCNC: 73 U/L — SIGNIFICANT CHANGE UP (ref 40–120)
ALT FLD-CCNC: 23 U/L DA — SIGNIFICANT CHANGE UP (ref 10–60)
ANION GAP SERPL CALC-SCNC: 4 MMOL/L — LOW (ref 5–17)
APTT BLD: 45.4 SEC — HIGH (ref 27.5–35.5)
AST SERPL-CCNC: 14 U/L — SIGNIFICANT CHANGE UP (ref 10–40)
BASOPHILS # BLD AUTO: 0.02 K/UL — SIGNIFICANT CHANGE UP (ref 0–0.2)
BASOPHILS NFR BLD AUTO: 0.4 % — SIGNIFICANT CHANGE UP (ref 0–2)
BILIRUB SERPL-MCNC: 0.7 MG/DL — SIGNIFICANT CHANGE UP (ref 0.2–1.2)
BUN SERPL-MCNC: 25 MG/DL — HIGH (ref 7–18)
CALCIUM SERPL-MCNC: 9 MG/DL — SIGNIFICANT CHANGE UP (ref 8.4–10.5)
CHLORIDE SERPL-SCNC: 106 MMOL/L — SIGNIFICANT CHANGE UP (ref 96–108)
CO2 SERPL-SCNC: 29 MMOL/L — SIGNIFICANT CHANGE UP (ref 22–31)
CREAT SERPL-MCNC: 0.79 MG/DL — SIGNIFICANT CHANGE UP (ref 0.5–1.3)
EOSINOPHIL # BLD AUTO: 0.12 K/UL — SIGNIFICANT CHANGE UP (ref 0–0.5)
EOSINOPHIL NFR BLD AUTO: 2.7 % — SIGNIFICANT CHANGE UP (ref 0–6)
GLUCOSE SERPL-MCNC: 161 MG/DL — HIGH (ref 70–99)
HCT VFR BLD CALC: 39.6 % — SIGNIFICANT CHANGE UP (ref 39–50)
HGB BLD-MCNC: 13.7 G/DL — SIGNIFICANT CHANGE UP (ref 13–17)
IMM GRANULOCYTES NFR BLD AUTO: 0.4 % — SIGNIFICANT CHANGE UP (ref 0–1.5)
INR BLD: 1.05 RATIO — SIGNIFICANT CHANGE UP (ref 0.88–1.16)
LYMPHOCYTES # BLD AUTO: 1.3 K/UL — SIGNIFICANT CHANGE UP (ref 1–3.3)
LYMPHOCYTES # BLD AUTO: 28.8 % — SIGNIFICANT CHANGE UP (ref 13–44)
MCHC RBC-ENTMCNC: 31.1 PG — SIGNIFICANT CHANGE UP (ref 27–34)
MCHC RBC-ENTMCNC: 34.6 GM/DL — SIGNIFICANT CHANGE UP (ref 32–36)
MCV RBC AUTO: 89.8 FL — SIGNIFICANT CHANGE UP (ref 80–100)
MONOCYTES # BLD AUTO: 0.31 K/UL — SIGNIFICANT CHANGE UP (ref 0–0.9)
MONOCYTES NFR BLD AUTO: 6.9 % — SIGNIFICANT CHANGE UP (ref 2–14)
NEUTROPHILS # BLD AUTO: 2.74 K/UL — SIGNIFICANT CHANGE UP (ref 1.8–7.4)
NEUTROPHILS NFR BLD AUTO: 60.8 % — SIGNIFICANT CHANGE UP (ref 43–77)
NRBC # BLD: 0 /100 WBCS — SIGNIFICANT CHANGE UP (ref 0–0)
OB PNL STL: POSITIVE
PLATELET # BLD AUTO: 217 K/UL — SIGNIFICANT CHANGE UP (ref 150–400)
POTASSIUM SERPL-MCNC: 4 MMOL/L — SIGNIFICANT CHANGE UP (ref 3.5–5.3)
POTASSIUM SERPL-SCNC: 4 MMOL/L — SIGNIFICANT CHANGE UP (ref 3.5–5.3)
PROT SERPL-MCNC: 7.1 G/DL — SIGNIFICANT CHANGE UP (ref 6–8.3)
PROTHROM AB SERPL-ACNC: 12.5 SEC — SIGNIFICANT CHANGE UP (ref 10.6–13.6)
RBC # BLD: 4.41 M/UL — SIGNIFICANT CHANGE UP (ref 4.2–5.8)
RBC # FLD: 11.5 % — SIGNIFICANT CHANGE UP (ref 10.3–14.5)
SODIUM SERPL-SCNC: 139 MMOL/L — SIGNIFICANT CHANGE UP (ref 135–145)
WBC # BLD: 4.51 K/UL — SIGNIFICANT CHANGE UP (ref 3.8–10.5)
WBC # FLD AUTO: 4.51 K/UL — SIGNIFICANT CHANGE UP (ref 3.8–10.5)

## 2021-03-22 PROCEDURE — 86900 BLOOD TYPING SEROLOGIC ABO: CPT

## 2021-03-22 PROCEDURE — 36415 COLL VENOUS BLD VENIPUNCTURE: CPT

## 2021-03-22 PROCEDURE — 80053 COMPREHEN METABOLIC PANEL: CPT

## 2021-03-22 PROCEDURE — 86901 BLOOD TYPING SEROLOGIC RH(D): CPT

## 2021-03-22 PROCEDURE — 85610 PROTHROMBIN TIME: CPT

## 2021-03-22 PROCEDURE — 85730 THROMBOPLASTIN TIME PARTIAL: CPT

## 2021-03-22 PROCEDURE — 85025 COMPLETE CBC W/AUTO DIFF WBC: CPT

## 2021-03-22 PROCEDURE — 93005 ELECTROCARDIOGRAM TRACING: CPT

## 2021-03-22 PROCEDURE — 82272 OCCULT BLD FECES 1-3 TESTS: CPT

## 2021-03-22 PROCEDURE — 99284 EMERGENCY DEPT VISIT MOD MDM: CPT

## 2021-03-22 PROCEDURE — 99283 EMERGENCY DEPT VISIT LOW MDM: CPT

## 2021-03-22 PROCEDURE — 86850 RBC ANTIBODY SCREEN: CPT

## 2021-03-22 NOTE — ED PROVIDER NOTE - PATIENT PORTAL LINK FT
You can access the FollowMyHealth Patient Portal offered by [facility] by registering at the following website: [email or web address]. By joining [facility]’s FollowMyHealth portal, you will also be able to view your health information using other applications (apps) compatible with our system.

## 2021-03-22 NOTE — ED PROVIDER NOTE - CARE PROVIDER_API CALL
Catrachito Rogers)  Gastroenterology; Internal Medicine  [address] [place] [id number]  Phone: [phone]  Fax: [phone]  Follow Up Time: 1-3 Days

## 2021-03-22 NOTE — ED PROVIDER NOTE - PHYSICAL EXAMINATION
Afebrile, hemodynamically stable, saturating well  NAD, well appearing, sitting comfortably in bed  Head NCAT  EOMI grossly, anicteric  MMM  RRR, nml S1/S2, no m/r/g  Lungs CTAB, no w/r/r  Abd soft, NT, ND, nml BS, no rebound or guarding   (KRISTOPHER Pereyra as chaperone): light red stool  AAO, CN's 3-12 grossly intact  THORNTON spontaneously, no leg cyanosis or edema  Skin warm, well perfused, no rashes or hives

## 2021-03-22 NOTE — ED PROVIDER NOTE - OBJECTIVE STATEMENT
Mandarin translators [id number] and [id number].  56yoM with h/o anemia presents with black stools x 2 days, states today had 1, yesterday 2. Sent in by Dr. Tracey Pak for evaluation. Reports some dizziness. Denies all other symptoms including abdominal pain, CP, SOB, vomiting, fever. Review of EMR and pt's records with him shows similar presentation in May, Hb 5, s/p 3 PRBC transfusion, EGD/colonoscopy and CT enterography showed mild gastritis, H. pylori, and possible metaplasia.

## 2021-03-22 NOTE — ED PROVIDER NOTE - NSFOLLOWUPINSTRUCTIONS_ED_ALL_ED_FT
Please follow up with your gastroenterologist in 1-2 days.  Please return to the emergency department if you have worsening bleeding, feeling very dizzy, abdominal pain, or any other symptoms.

## 2021-03-22 NOTE — ED PROVIDER NOTE - CLINICAL SUMMARY MEDICAL DECISION MAKING FREE TEXT BOX
No abdominal pain/tenderness with low suspicion for acute process. No abdominal pain/tenderness with low suspicion for acute process. No anemia, no active bleeding, pt hemodynamically stable, no pallor and appears to have full strength here in ED. D/w PMD Dr. Tracey Pak who agrees with discharge at this time. Patient is well appearing, NAD, afebrile, hemodynamically stable. Any available tests and studies were discussed with patient via Mandarin  [id number]. Discharged with instructions in further symptomatic care, return precautions, and need for GI f/u.

## 2022-03-04 NOTE — PROGRESS NOTE ADULT - REASON FOR ADMISSION
Looks Ok  Stable results.  Please notify patient
Dizziness

## 2022-12-07 NOTE — ED ADULT NURSE NOTE - SUICIDE SCREENING QUESTION 2
No
PAST MEDICAL HISTORY:  CAD S/P percutaneous coronary angioplasty     Cerebrovascular accident (CVA) November 2020, June 2020, May 2020    HTN (hypertension)     Polio     Rheumatic fever     TIA (transient ischemic attack)
